# Patient Record
Sex: FEMALE | Race: WHITE | NOT HISPANIC OR LATINO | URBAN - METROPOLITAN AREA
[De-identification: names, ages, dates, MRNs, and addresses within clinical notes are randomized per-mention and may not be internally consistent; named-entity substitution may affect disease eponyms.]

---

## 2017-10-27 ENCOUNTER — INPATIENT (INPATIENT)
Facility: HOSPITAL | Age: 67
LOS: 9 days | Discharge: NURSING, REHAB OR LONG-TERM CARE | DRG: 482 | End: 2017-11-06
Attending: ORTHOPAEDIC SURGERY | Admitting: ORTHOPAEDIC SURGERY
Payer: MEDICARE

## 2017-10-27 VITALS
DIASTOLIC BLOOD PRESSURE: 85 MMHG | SYSTOLIC BLOOD PRESSURE: 148 MMHG | HEART RATE: 89 BPM | RESPIRATION RATE: 16 BRPM | OXYGEN SATURATION: 99 % | TEMPERATURE: 98 F

## 2017-10-27 DIAGNOSIS — Z85.831 PERSONAL HISTORY OF MALIGNANT NEOPLASM OF SOFT TISSUE: ICD-10-CM

## 2017-10-27 DIAGNOSIS — T50.8X5A ADVERSE EFFECT OF DIAGNOSTIC AGENTS, INITIAL ENCOUNTER: ICD-10-CM

## 2017-10-27 DIAGNOSIS — Z92.3 PERSONAL HISTORY OF IRRADIATION: ICD-10-CM

## 2017-10-27 LAB
ALBUMIN SERPL ELPH-MCNC: 4.5 G/DL — SIGNIFICANT CHANGE UP (ref 3.3–5)
ALP SERPL-CCNC: 82 U/L — SIGNIFICANT CHANGE UP (ref 40–120)
ALT FLD-CCNC: 17 U/L — SIGNIFICANT CHANGE UP (ref 10–45)
ANION GAP SERPL CALC-SCNC: 18 MMOL/L — HIGH (ref 5–17)
APTT BLD: 27.9 SEC — SIGNIFICANT CHANGE UP (ref 27.5–37.4)
AST SERPL-CCNC: 16 U/L — SIGNIFICANT CHANGE UP (ref 10–40)
BILIRUB SERPL-MCNC: 0.6 MG/DL — SIGNIFICANT CHANGE UP (ref 0.2–1.2)
BLD GP AB SCN SERPL QL: NEGATIVE — SIGNIFICANT CHANGE UP
BLD GP AB SCN SERPL QL: NEGATIVE — SIGNIFICANT CHANGE UP
BUN SERPL-MCNC: 11 MG/DL — SIGNIFICANT CHANGE UP (ref 7–23)
CALCIUM SERPL-MCNC: 10 MG/DL — SIGNIFICANT CHANGE UP (ref 8.4–10.5)
CHLORIDE SERPL-SCNC: 99 MMOL/L — SIGNIFICANT CHANGE UP (ref 96–108)
CO2 SERPL-SCNC: 21 MMOL/L — LOW (ref 22–31)
CREAT SERPL-MCNC: 0.52 MG/DL — SIGNIFICANT CHANGE UP (ref 0.5–1.3)
GLUCOSE SERPL-MCNC: 124 MG/DL — HIGH (ref 70–99)
HCT VFR BLD CALC: 42.8 % — SIGNIFICANT CHANGE UP (ref 34.5–45)
HGB BLD-MCNC: 14.2 G/DL — SIGNIFICANT CHANGE UP (ref 11.5–15.5)
INR BLD: 1.05 — SIGNIFICANT CHANGE UP (ref 0.88–1.16)
MAGNESIUM SERPL-MCNC: 2 MG/DL — SIGNIFICANT CHANGE UP (ref 1.6–2.6)
MCHC RBC-ENTMCNC: 30 PG — SIGNIFICANT CHANGE UP (ref 27–34)
MCHC RBC-ENTMCNC: 33.2 G/DL — SIGNIFICANT CHANGE UP (ref 32–36)
MCV RBC AUTO: 90.3 FL — SIGNIFICANT CHANGE UP (ref 80–100)
PLATELET # BLD AUTO: 366 K/UL — SIGNIFICANT CHANGE UP (ref 150–400)
POTASSIUM SERPL-MCNC: 3.5 MMOL/L — SIGNIFICANT CHANGE UP (ref 3.5–5.3)
POTASSIUM SERPL-SCNC: 3.5 MMOL/L — SIGNIFICANT CHANGE UP (ref 3.5–5.3)
PROT SERPL-MCNC: 7.9 G/DL — SIGNIFICANT CHANGE UP (ref 6–8.3)
PROTHROM AB SERPL-ACNC: 11.7 SEC — SIGNIFICANT CHANGE UP (ref 9.8–12.7)
RBC # BLD: 4.74 M/UL — SIGNIFICANT CHANGE UP (ref 3.8–5.2)
RBC # FLD: 13.3 % — SIGNIFICANT CHANGE UP (ref 10.3–16.9)
RH IG SCN BLD-IMP: POSITIVE — SIGNIFICANT CHANGE UP
RH IG SCN BLD-IMP: POSITIVE — SIGNIFICANT CHANGE UP
SODIUM SERPL-SCNC: 138 MMOL/L — SIGNIFICANT CHANGE UP (ref 135–145)
WBC # BLD: 12.8 K/UL — HIGH (ref 3.8–10.5)
WBC # FLD AUTO: 12.8 K/UL — HIGH (ref 3.8–10.5)

## 2017-10-27 PROCEDURE — 71010: CPT | Mod: 26

## 2017-10-27 PROCEDURE — 73552 X-RAY EXAM OF FEMUR 2/>: CPT | Mod: 26,RT

## 2017-10-27 PROCEDURE — 73723 MRI JOINT LWR EXTR W/O&W/DYE: CPT | Mod: 26,RT

## 2017-10-27 PROCEDURE — 73700 CT LOWER EXTREMITY W/O DYE: CPT | Mod: 26,RT

## 2017-10-27 PROCEDURE — 99285 EMERGENCY DEPT VISIT HI MDM: CPT

## 2017-10-27 PROCEDURE — 73560 X-RAY EXAM OF KNEE 1 OR 2: CPT | Mod: 26,RT

## 2017-10-27 RX ORDER — ACETAMINOPHEN 500 MG
650 TABLET ORAL EVERY 6 HOURS
Qty: 0 | Refills: 0 | Status: DISCONTINUED | OUTPATIENT
Start: 2017-10-27 | End: 2017-10-30

## 2017-10-27 RX ORDER — DOCUSATE SODIUM 100 MG
100 CAPSULE ORAL THREE TIMES A DAY
Qty: 0 | Refills: 0 | Status: DISCONTINUED | OUTPATIENT
Start: 2017-10-27 | End: 2017-10-30

## 2017-10-27 RX ORDER — METOCLOPRAMIDE HCL 10 MG
10 TABLET ORAL EVERY 6 HOURS
Qty: 0 | Refills: 0 | Status: DISCONTINUED | OUTPATIENT
Start: 2017-10-27 | End: 2017-11-02

## 2017-10-27 RX ORDER — SODIUM CHLORIDE 9 MG/ML
1000 INJECTION, SOLUTION INTRAVENOUS
Qty: 0 | Refills: 0 | Status: DISCONTINUED | OUTPATIENT
Start: 2017-10-27 | End: 2017-11-02

## 2017-10-27 RX ORDER — OXYCODONE HYDROCHLORIDE 5 MG/1
10 TABLET ORAL EVERY 4 HOURS
Qty: 0 | Refills: 0 | Status: DISCONTINUED | OUTPATIENT
Start: 2017-10-27 | End: 2017-10-30

## 2017-10-27 RX ORDER — ACETAMINOPHEN 500 MG
650 TABLET ORAL EVERY 6 HOURS
Qty: 0 | Refills: 0 | Status: DISCONTINUED | OUTPATIENT
Start: 2017-10-27 | End: 2017-11-02

## 2017-10-27 RX ORDER — MORPHINE SULFATE 50 MG/1
4 CAPSULE, EXTENDED RELEASE ORAL EVERY 4 HOURS
Qty: 0 | Refills: 0 | Status: DISCONTINUED | OUTPATIENT
Start: 2017-10-27 | End: 2017-10-28

## 2017-10-27 NOTE — ED PROVIDER NOTE - MUSCULOSKELETAL MINIMAL EXAM
RANGE OF MOTION LIMITED/Right knee and distal quadriceps swollen, tense, Deformed right distal femur, Ballotment patellae + on right, pulses intact, sensation intact, no erythema or warmth Right knee and distal quadriceps swollen, tense, Deformed right distal femur, Ballotment patellae + on right, pulses intact, sensation intact, no erythema or warmth, +anterior drawer test, valgus and varus laxity present on right. Left all intact/RANGE OF MOTION LIMITED

## 2017-10-27 NOTE — ED PROVIDER NOTE - NS ED ROS FT
REVIEW OF SYSTEMS:    CONSTITUTIONAL: No fever, weight loss, or fatigue  EYES: No eye pain, visual disturbances, or discharge  ENMT:  No difficulty hearing, tinnitus, vertigo; No sinus or throat pain  NECK: No pain or stiffness  BREASTS: No pain, masses, or nipple discharge  RESPIRATORY: No cough, wheezing, chills or hemoptysis; No shortness of breath  CARDIOVASCULAR: No chest pain, palpitations, dizziness, or leg swelling  GASTROINTESTINAL: No abdominal or epigastric pain. No nausea, vomiting, or hematemesis; No diarrhea or constipation. No melena or hematochezia.  GENITOURINARY: No dysuria, frequency, hematuria, or incontinence  NEUROLOGICAL: No headaches, memory loss, loss of strength, numbness, or tremors  SKIN: No itching, burning, rashes, or lesions   LYMPH NODES: No enlarged glands  ENDOCRINE: No heat or cold intolerance; No hair loss  MUSCULOSKELETAL: as above  PSYCHIATRIC: No depression, anxiety, mood swings, or difficulty sleeping  HEME/LYMPH: No easy bruising, or bleeding gums  ALLERY AND IMMUNOLOGIC: No hives or eczema

## 2017-10-27 NOTE — ED ADULT NURSE NOTE - CHPI ED SYMPTOMS NEG
no tingling/no chills/no decreased eating/drinking/no nausea/no numbness/no dizziness/no weakness/no vomiting/no fever

## 2017-10-27 NOTE — ED PROVIDER NOTE - OBJECTIVE STATEMENT
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 She reports she was standing in a dressing room and did a semi external rotation movement in her right knee when she heard a popping sound. She subsequently wasn't able to stand on her right leg anymore, felt like the knee was twisting below her. She never had injuries like this in the past.

## 2017-10-27 NOTE — ED PROVIDER NOTE - MEDICAL DECISION MAKING DETAILS
68 yo F with h/o leiomyosarcoma 13 yrs ago pw right knee pain and inability to ambulate, found to leave right femoral fracuture, likely pathologic. Preop labs, EKG and CXR done. Ortho consulted, admit to Ortho under  for surgery tomorrow. 68 yo F with h/o leiomyosarcoma 13 yrs ago pw right knee pain and inability to ambulate, found to leave right femoral fx, likely pathologic. Preop labs, EKG and CXR done. Ortho consulted, admit to Ortho under  for surgery tomorrow.

## 2017-10-27 NOTE — ED ADULT NURSE NOTE - OBJECTIVE STATEMENT
67 year female patient with c/o of R knee pain after twisting the wrong way.  No distress noted.  Breathing easily and unlabored.  States unable to bear weight.  Rknee appears swollen.

## 2017-10-27 NOTE — ED PROVIDER NOTE - ATTENDING CONTRIBUTION TO CARE
66 yo F with prior leiomysarcoma left quad 2000 s/p partial resection- had twisting injury to left leg 1 hr before arrival - now with distal femur fracture- likely pathologic- req admission for ORIF

## 2017-10-28 DIAGNOSIS — Z01.818 ENCOUNTER FOR OTHER PREPROCEDURAL EXAMINATION: ICD-10-CM

## 2017-10-28 DIAGNOSIS — C49.9 MALIGNANT NEOPLASM OF CONNECTIVE AND SOFT TISSUE, UNSPECIFIED: ICD-10-CM

## 2017-10-28 DIAGNOSIS — S72.91XA UNSPECIFIED FRACTURE OF RIGHT FEMUR, INITIAL ENCOUNTER FOR CLOSED FRACTURE: ICD-10-CM

## 2017-10-28 LAB
ANION GAP SERPL CALC-SCNC: 13 MMOL/L — SIGNIFICANT CHANGE UP (ref 5–17)
APPEARANCE UR: CLEAR — SIGNIFICANT CHANGE UP
BASOPHILS NFR BLD AUTO: 0.3 % — SIGNIFICANT CHANGE UP (ref 0–2)
BILIRUB UR-MCNC: NEGATIVE — SIGNIFICANT CHANGE UP
BUN SERPL-MCNC: 9 MG/DL — SIGNIFICANT CHANGE UP (ref 7–23)
CALCIUM SERPL-MCNC: 9.1 MG/DL — SIGNIFICANT CHANGE UP (ref 8.4–10.5)
CHLORIDE SERPL-SCNC: 102 MMOL/L — SIGNIFICANT CHANGE UP (ref 96–108)
CO2 SERPL-SCNC: 24 MMOL/L — SIGNIFICANT CHANGE UP (ref 22–31)
COLOR SPEC: YELLOW — SIGNIFICANT CHANGE UP
CREAT SERPL-MCNC: 0.55 MG/DL — SIGNIFICANT CHANGE UP (ref 0.5–1.3)
DIFF PNL FLD: NEGATIVE — SIGNIFICANT CHANGE UP
EOSINOPHIL NFR BLD AUTO: 0.4 % — SIGNIFICANT CHANGE UP (ref 0–6)
GLUCOSE SERPL-MCNC: 103 MG/DL — HIGH (ref 70–99)
GLUCOSE UR QL: NEGATIVE — SIGNIFICANT CHANGE UP
HCT VFR BLD CALC: 38.2 % — SIGNIFICANT CHANGE UP (ref 34.5–45)
HGB BLD-MCNC: 12.5 G/DL — SIGNIFICANT CHANGE UP (ref 11.5–15.5)
KETONES UR-MCNC: >=80 MG/DL
LEUKOCYTE ESTERASE UR-ACNC: (no result)
LYMPHOCYTES # BLD AUTO: 18.9 % — SIGNIFICANT CHANGE UP (ref 13–44)
MCHC RBC-ENTMCNC: 30 PG — SIGNIFICANT CHANGE UP (ref 27–34)
MCHC RBC-ENTMCNC: 32.7 G/DL — SIGNIFICANT CHANGE UP (ref 32–36)
MCV RBC AUTO: 91.8 FL — SIGNIFICANT CHANGE UP (ref 80–100)
MONOCYTES NFR BLD AUTO: 8.6 % — SIGNIFICANT CHANGE UP (ref 2–14)
NEUTROPHILS NFR BLD AUTO: 71.8 % — SIGNIFICANT CHANGE UP (ref 43–77)
NITRITE UR-MCNC: NEGATIVE — SIGNIFICANT CHANGE UP
PH UR: 6.5 — SIGNIFICANT CHANGE UP (ref 5–8)
PLATELET # BLD AUTO: 329 K/UL — SIGNIFICANT CHANGE UP (ref 150–400)
POTASSIUM SERPL-MCNC: 3.6 MMOL/L — SIGNIFICANT CHANGE UP (ref 3.5–5.3)
POTASSIUM SERPL-SCNC: 3.6 MMOL/L — SIGNIFICANT CHANGE UP (ref 3.5–5.3)
PROT UR-MCNC: NEGATIVE MG/DL — SIGNIFICANT CHANGE UP
RBC # BLD: 4.16 M/UL — SIGNIFICANT CHANGE UP (ref 3.8–5.2)
RBC # FLD: 13.2 % — SIGNIFICANT CHANGE UP (ref 10.3–16.9)
SODIUM SERPL-SCNC: 139 MMOL/L — SIGNIFICANT CHANGE UP (ref 135–145)
SP GR SPEC: <=1.005 — SIGNIFICANT CHANGE UP (ref 1–1.03)
UROBILINOGEN FLD QL: 0.2 E.U./DL — SIGNIFICANT CHANGE UP
WBC # BLD: 11.9 K/UL — HIGH (ref 3.8–10.5)
WBC # FLD AUTO: 11.9 K/UL — HIGH (ref 3.8–10.5)

## 2017-10-28 PROCEDURE — 99222 1ST HOSP IP/OBS MODERATE 55: CPT | Mod: GC

## 2017-10-28 RX ORDER — ZALEPLON 10 MG
5 CAPSULE ORAL AT BEDTIME
Qty: 0 | Refills: 0 | Status: DISCONTINUED | OUTPATIENT
Start: 2017-10-28 | End: 2017-11-02

## 2017-10-28 RX ORDER — MORPHINE SULFATE 50 MG/1
2 CAPSULE, EXTENDED RELEASE ORAL EVERY 4 HOURS
Qty: 0 | Refills: 0 | Status: DISCONTINUED | OUTPATIENT
Start: 2017-10-28 | End: 2017-11-02

## 2017-10-28 RX ADMIN — Medication 650 MILLIGRAM(S): at 01:16

## 2017-10-28 RX ADMIN — MORPHINE SULFATE 2 MILLIGRAM(S): 50 CAPSULE, EXTENDED RELEASE ORAL at 23:19

## 2017-10-28 RX ADMIN — SODIUM CHLORIDE 80 MILLILITER(S): 9 INJECTION, SOLUTION INTRAVENOUS at 01:10

## 2017-10-28 RX ADMIN — Medication 650 MILLIGRAM(S): at 17:26

## 2017-10-28 RX ADMIN — Medication 650 MILLIGRAM(S): at 12:18

## 2017-10-28 RX ADMIN — Medication 650 MILLIGRAM(S): at 11:19

## 2017-10-28 RX ADMIN — Medication 650 MILLIGRAM(S): at 18:00

## 2017-10-28 RX ADMIN — Medication 650 MILLIGRAM(S): at 02:25

## 2017-10-28 RX ADMIN — Medication 10 MILLIGRAM(S): at 22:59

## 2017-10-28 RX ADMIN — MORPHINE SULFATE 2 MILLIGRAM(S): 50 CAPSULE, EXTENDED RELEASE ORAL at 22:13

## 2017-10-28 RX ADMIN — Medication 100 MILLIGRAM(S): at 21:31

## 2017-10-28 RX ADMIN — Medication 5 MILLIGRAM(S): at 01:53

## 2017-10-28 RX ADMIN — Medication 100 MILLIGRAM(S): at 13:05

## 2017-10-28 RX ADMIN — SODIUM CHLORIDE 80 MILLILITER(S): 9 INJECTION, SOLUTION INTRAVENOUS at 13:05

## 2017-10-28 NOTE — H&P ADULT - ASSESSMENT
66F w/ PMH leiomyosarcoma p/w R distal femur fx s/p twisting injury, concern for pathologic fx  -Admit to ortho- Dr. Mccurdy  -Preopped for possible R CMN, however pending imaging and plan from attending  -f/u CT/MRI reads for r/o malignancy  -NWB in  RLE  -pain control  -plan pending imaging and further discussion w/ patient  -case d/w chief resident and Dr. Mccurdy

## 2017-10-28 NOTE — H&P ADULT - HISTORY OF PRESENT ILLNESS
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  Pt reports she was in a changing room at a store when she twisted around her R leg the wrong way, felt a pop, and then noticed immediate swelling/deformity/pain. Denies any other injury/trauma, numbness/weakness, any other CA hx.

## 2017-10-28 NOTE — H&P ADULT - NSHPPHYSICALEXAM_GEN_ALL_CORE
NAD, AOx3, comfortable  R thigh: large healed scar on medial thigh, +swelling/deformity at distal femur  5/5 EHL/FHL/GS/TA  Sensory: SILT  Pulses: wwp, DP/PT 2+

## 2017-10-28 NOTE — CONSULT NOTE ADULT - PROBLEM SELECTOR RECOMMENDATION 3
The patient's medical condition is optimized for surgery.  There is no contraindication for surgery.  There is no clinical evidence neither of angina, decompensated CHF, arrhthymias, nor valvular disease.   There is no limitation of exercise capacity.  MET is7  .  ASA class is 2.  Conde cardiac risk factor is low  .  DVT prophylaxis is indicated.  Pain control.  Early mobilization.  Avoid fluid overload.

## 2017-10-29 PROCEDURE — 71260 CT THORAX DX C+: CPT | Mod: 26

## 2017-10-29 PROCEDURE — 99232 SBSQ HOSP IP/OBS MODERATE 35: CPT

## 2017-10-29 RX ORDER — HEPARIN SODIUM 5000 [USP'U]/ML
5000 INJECTION INTRAVENOUS; SUBCUTANEOUS EVERY 8 HOURS
Qty: 0 | Refills: 0 | Status: DISCONTINUED | OUTPATIENT
Start: 2017-10-29 | End: 2017-10-29

## 2017-10-29 RX ADMIN — Medication 100 MILLIGRAM(S): at 22:19

## 2017-10-29 RX ADMIN — Medication 650 MILLIGRAM(S): at 09:58

## 2017-10-29 RX ADMIN — MORPHINE SULFATE 2 MILLIGRAM(S): 50 CAPSULE, EXTENDED RELEASE ORAL at 22:19

## 2017-10-29 RX ADMIN — Medication 10 MILLIGRAM(S): at 22:18

## 2017-10-29 RX ADMIN — Medication 100 MILLIGRAM(S): at 13:48

## 2017-10-29 RX ADMIN — Medication 650 MILLIGRAM(S): at 10:58

## 2017-10-29 RX ADMIN — MORPHINE SULFATE 2 MILLIGRAM(S): 50 CAPSULE, EXTENDED RELEASE ORAL at 22:35

## 2017-10-29 NOTE — PROGRESS NOTE ADULT - SUBJECTIVE AND OBJECTIVE BOX
S: Patient seen and examined. Pt. doing well, Pain endorsed but controlled. No acute events overnight.    Vital Signs Last 24 Hrs  T(C): 37.6 (29 Oct 2017 08:29), Max: 37.6 (28 Oct 2017 20:19)  T(F): 99.6 (29 Oct 2017 08:29), Max: 99.7 (28 Oct 2017 20:19)  HR: 73 (29 Oct 2017 08:29) (73 - 92)  BP: 124/83 (29 Oct 2017 08:29) (124/83 - 135/64)  BP(mean): --  RR: 16 (29 Oct 2017 08:29) (15 - 17)  SpO2: 98% (29 Oct 2017 08:29) (95% - 98%)    NAD, AOx3, comfortable  Motor: 5/5 GS/TA/EHL/FHL    Sensation: SILT   Pulses: WWP, DP/PT 2+                              12.5   11.9  )-----------( 329      ( 28 Oct 2017 06:16 )             38.2         A/P:  67y Female w/ R femur pathologic fx    -Stable  -plan for IR guided biopsy Monday  -Pain Control  -PT/NWB in KI  -DVT ppx: HSQ  -Advance diet as tolerated  -f/u AM labs  -f/u CT chest  -Dispo: pending      Armando Gill MD, PGY-2  [phone]

## 2017-10-30 DIAGNOSIS — R91.1 SOLITARY PULMONARY NODULE: ICD-10-CM

## 2017-10-30 LAB
ANION GAP SERPL CALC-SCNC: 13 MMOL/L — SIGNIFICANT CHANGE UP (ref 5–17)
BUN SERPL-MCNC: 11 MG/DL — SIGNIFICANT CHANGE UP (ref 7–23)
CALCIUM SERPL-MCNC: 9.1 MG/DL — SIGNIFICANT CHANGE UP (ref 8.4–10.5)
CHLORIDE SERPL-SCNC: 103 MMOL/L — SIGNIFICANT CHANGE UP (ref 96–108)
CO2 SERPL-SCNC: 26 MMOL/L — SIGNIFICANT CHANGE UP (ref 22–31)
CREAT SERPL-MCNC: 0.58 MG/DL — SIGNIFICANT CHANGE UP (ref 0.5–1.3)
GLUCOSE SERPL-MCNC: 107 MG/DL — HIGH (ref 70–99)
HCT VFR BLD CALC: 36.8 % — SIGNIFICANT CHANGE UP (ref 34.5–45)
HGB BLD-MCNC: 12.6 G/DL — SIGNIFICANT CHANGE UP (ref 11.5–15.5)
MCHC RBC-ENTMCNC: 31.3 PG — SIGNIFICANT CHANGE UP (ref 27–34)
MCHC RBC-ENTMCNC: 34.2 G/DL — SIGNIFICANT CHANGE UP (ref 32–36)
MCV RBC AUTO: 91.3 FL — SIGNIFICANT CHANGE UP (ref 80–100)
PLATELET # BLD AUTO: 309 K/UL — SIGNIFICANT CHANGE UP (ref 150–400)
POTASSIUM SERPL-MCNC: 3.6 MMOL/L — SIGNIFICANT CHANGE UP (ref 3.5–5.3)
POTASSIUM SERPL-SCNC: 3.6 MMOL/L — SIGNIFICANT CHANGE UP (ref 3.5–5.3)
RBC # BLD: 4.03 M/UL — SIGNIFICANT CHANGE UP (ref 3.8–5.2)
RBC # FLD: 13.3 % — SIGNIFICANT CHANGE UP (ref 10.3–16.9)
SODIUM SERPL-SCNC: 142 MMOL/L — SIGNIFICANT CHANGE UP (ref 135–145)
WBC # BLD: 9.2 K/UL — SIGNIFICANT CHANGE UP (ref 3.8–10.5)
WBC # FLD AUTO: 9.2 K/UL — SIGNIFICANT CHANGE UP (ref 3.8–10.5)

## 2017-10-30 PROCEDURE — 20225 BONE BIOPSY TROCAR/NDL DEEP: CPT

## 2017-10-30 PROCEDURE — 77012 CT SCAN FOR NEEDLE BIOPSY: CPT | Mod: 26

## 2017-10-30 PROCEDURE — 99232 SBSQ HOSP IP/OBS MODERATE 35: CPT | Mod: GC

## 2017-10-30 RX ORDER — POLYETHYLENE GLYCOL 3350 17 G/17G
17 POWDER, FOR SOLUTION ORAL DAILY
Qty: 0 | Refills: 0 | Status: DISCONTINUED | OUTPATIENT
Start: 2017-10-30 | End: 2017-11-02

## 2017-10-30 RX ORDER — SENNA PLUS 8.6 MG/1
2 TABLET ORAL AT BEDTIME
Qty: 0 | Refills: 0 | Status: DISCONTINUED | OUTPATIENT
Start: 2017-10-30 | End: 2017-11-02

## 2017-10-30 RX ORDER — OXYCODONE HYDROCHLORIDE 5 MG/1
5 TABLET ORAL EVERY 4 HOURS
Qty: 0 | Refills: 0 | Status: DISCONTINUED | OUTPATIENT
Start: 2017-10-30 | End: 2017-11-02

## 2017-10-30 RX ORDER — ACETAMINOPHEN 500 MG
975 TABLET ORAL EVERY 8 HOURS
Qty: 0 | Refills: 0 | Status: DISCONTINUED | OUTPATIENT
Start: 2017-10-30 | End: 2017-11-02

## 2017-10-30 RX ORDER — OXYCODONE HYDROCHLORIDE 5 MG/1
10 TABLET ORAL EVERY 4 HOURS
Qty: 0 | Refills: 0 | Status: DISCONTINUED | OUTPATIENT
Start: 2017-10-30 | End: 2017-11-02

## 2017-10-30 RX ORDER — DOCUSATE SODIUM 100 MG
100 CAPSULE ORAL THREE TIMES A DAY
Qty: 0 | Refills: 0 | Status: DISCONTINUED | OUTPATIENT
Start: 2017-10-30 | End: 2017-11-02

## 2017-10-30 RX ADMIN — SODIUM CHLORIDE 80 MILLILITER(S): 9 INJECTION, SOLUTION INTRAVENOUS at 03:26

## 2017-10-30 RX ADMIN — POLYETHYLENE GLYCOL 3350 17 GRAM(S): 17 POWDER, FOR SOLUTION ORAL at 17:31

## 2017-10-30 RX ADMIN — Medication 975 MILLIGRAM(S): at 17:29

## 2017-10-30 RX ADMIN — Medication 5 MILLIGRAM(S): at 22:19

## 2017-10-30 RX ADMIN — Medication 650 MILLIGRAM(S): at 11:30

## 2017-10-30 RX ADMIN — Medication 100 MILLIGRAM(S): at 05:25

## 2017-10-30 RX ADMIN — Medication 975 MILLIGRAM(S): at 22:00

## 2017-10-30 RX ADMIN — SENNA PLUS 2 TABLET(S): 8.6 TABLET ORAL at 21:25

## 2017-10-30 RX ADMIN — Medication 100 MILLIGRAM(S): at 21:25

## 2017-10-30 RX ADMIN — Medication 650 MILLIGRAM(S): at 10:03

## 2017-10-30 RX ADMIN — Medication 975 MILLIGRAM(S): at 21:25

## 2017-10-30 NOTE — PROGRESS NOTE ADULT - SUBJECTIVE AND OBJECTIVE BOX
S: Patient seen and examined. Pt. doing well, Pain endorsed but controlled. No acute events overnight.    Vital Signs Last 24 Hrs  T(C): 37.6 (29 Oct 2017 08:29), Max: 37.6 (28 Oct 2017 20:19)  T(F): 99.6 (29 Oct 2017 08:29), Max: 99.7 (28 Oct 2017 20:19)  HR: 73 (29 Oct 2017 08:29) (73 - 92)  BP: 124/83 (29 Oct 2017 08:29) (124/83 - 135/64)  BP(mean): --  RR: 16 (29 Oct 2017 08:29) (15 - 17)  SpO2: 98% (29 Oct 2017 08:29) (95% - 98%)    NAD, AOx3, comfortable  Motor: 5/5 GS/TA/EHL/FHL    Sensation: SILT   Pulses: WWP, DP/PT 2+                              12.5   11.9  )-----------( 329      ( 28 Oct 2017 06:16 )             38.2         A/P:  67y Female w/ R femur pathologic fx    -Stable  -plan for CT guided biopsy today  -Pain Control  -PT/NWB in KI  -DVT ppx: HSQ  -NPO for CT guided bx  -f/u AM labs  -f/u CT chest read  -Dispo: pending      Armando Gill MD, PGY-2  [phone] S: Patient seen and examined. Pt. doing well, Pain endorsed but controlled. No acute events overnight.    Vital Signs Last 24 Hrs  T(C): 36.8 (30 Oct 2017 08:49), Max: 37.1 (30 Oct 2017 05:00)  T(F): 98.3 (30 Oct 2017 08:49), Max: 98.7 (30 Oct 2017 05:00)  HR: 73 (30 Oct 2017 08:49) (73 - 83)  BP: 125/78 (30 Oct 2017 08:49) (117/72 - 125/78)  BP(mean): --  RR: 16 (30 Oct 2017 08:49) (16 - 17)  SpO2: 98% (30 Oct 2017 08:49) (96% - 98%)    NAD, AOx3, comfortable  Motor: 5/5 GS/TA/EHL/FHL    Sensation: SILT   Pulses: WWP, DP/PT 2+                                 12.6   9.2   )-----------( 309      ( 30 Oct 2017 06:08 )             36.8       A/P:  67y Female w/ R femur pathologic fx    -Stable  -plan for CT guided biopsy today  -Pain Control  -PT/NWB in KI  -DVT ppx: HSQ  -NPO for CT guided bx  -f/u AM labs  -f/u CT chest read  -Dispo: pending      Armando Gill MD, PGY-2  [phone]

## 2017-10-30 NOTE — PROGRESS NOTE ADULT - SUBJECTIVE AND OBJECTIVE BOX
Interval Events: reviewed  Patient seen and examined at bedside.    Patient is a 67y old  Female who presents with a chief complaint of R thigh pain (28 Oct 2017 00:13)  pain in the right leg.  She is for Bx today and had the CT scan of chest    PAST MEDICAL & SURGICAL HISTORY:  Leiomyosarcoma      MEDICATIONS:  Pulmonary:    Antimicrobials:    Anticoagulants:    Cardiac:      Allergies    No Known Allergies    Intolerances        Vital Signs Last 24 Hrs  T(C): 36.8 (30 Oct 2017 08:49), Max: 37.1 (30 Oct 2017 05:00)  T(F): 98.3 (30 Oct 2017 08:49), Max: 98.7 (30 Oct 2017 05:00)  HR: 73 (30 Oct 2017 08:49) (73 - 83)  BP: 125/78 (30 Oct 2017 08:49) (117/72 - 125/78)  BP(mean): --  RR: 16 (30 Oct 2017 08:49) (16 - 17)  SpO2: 98% (30 Oct 2017 08:49) (96% - 98%)        LABS:      CBC Full  -  ( 30 Oct 2017 06:08 )  WBC Count : 9.2 K/uL  Hemoglobin : 12.6 g/dL  Hematocrit : 36.8 %  Platelet Count - Automated : 309 K/uL  Mean Cell Volume : 91.3 fL  Mean Cell Hemoglobin : 31.3 pg  Mean Cell Hemoglobin Concentration : 34.2 g/dL  Auto Neutrophil # : x  Auto Lymphocyte # : x  Auto Monocyte # : x  Auto Eosinophil # : x  Auto Basophil # : x  Auto Neutrophil % : x  Auto Lymphocyte % : x  Auto Monocyte % : x  Auto Eosinophil % : x  Auto Basophil % : x    10-30    142  |  103  |  11  ----------------------------<  107<H>  3.6   |  26  |  0.58    Ca    9.1      30 Oct 2017 06:10      < from: CT Chest w/ IV Cont (10.29.17 @ 19:28) >  EXAM:  CT CHEST IC                          PROCEDURE DATE:  10/29/2017    Quantity of Contrast in Vial in ml: 100 Contrast Used: Optiray 350  Quantity of Contrast Wasted in ml: 7           INTERPRETATION:  CT of chest with contrast    Indication: Right thigh leiomyosarcoma. Malignancy workup.    Technique: CT of chest is performed following the administration of   intravenous contrast. Multiplanar images were reviewed.    Prior studies: None    Findings: Heart size is within normal limits. No pericardial effusion is   seen. There is no thoracic aortic aneurysm. There is a small calcified   right hilar lymph node likely a sequela of prior granulomatous disease.   No mediastinal or axillary lymphadenopathy is seen.    No pleural effusion is seen. There is a 6 mm part solid and part   groundglass nodule in the right lower lobe image #190 series 5. A few   micronodules are seen in the right lung apex measuring up to 4 mm. There   is a 2 mm nodule left upper lobe image 126 is 5.    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    Minimal degenerative changes of the spine are seen.    IMPRESSION:    6 mm nodule right lower lobe and a few other micronodules as above.   Findings are not typical for metastasis but recommend short-term   follow-up in 3 months.    < end of copied text >                      RADIOLOGY & ADDITIONAL STUDIES (The following images were personally reviewed):  Mota:                                  No  Urine output:               Yes         DVT prophylaxis:         Yes          Flattus:                          Yes         Bowel movement:              No

## 2017-10-31 LAB
NON-GYNECOLOGICAL CYTOLOGY STUDY: SIGNIFICANT CHANGE UP
SURGICAL PATHOLOGY STUDY: SIGNIFICANT CHANGE UP

## 2017-10-31 RX ORDER — HEPARIN SODIUM 5000 [USP'U]/ML
5000 INJECTION INTRAVENOUS; SUBCUTANEOUS EVERY 12 HOURS
Qty: 0 | Refills: 0 | Status: DISCONTINUED | OUTPATIENT
Start: 2017-10-31 | End: 2017-11-02

## 2017-10-31 RX ADMIN — Medication 100 MILLIGRAM(S): at 15:41

## 2017-10-31 RX ADMIN — Medication 975 MILLIGRAM(S): at 07:22

## 2017-10-31 RX ADMIN — Medication 975 MILLIGRAM(S): at 06:42

## 2017-10-31 RX ADMIN — Medication 975 MILLIGRAM(S): at 15:38

## 2017-10-31 RX ADMIN — Medication 975 MILLIGRAM(S): at 21:45

## 2017-10-31 RX ADMIN — HEPARIN SODIUM 5000 UNIT(S): 5000 INJECTION INTRAVENOUS; SUBCUTANEOUS at 21:45

## 2017-10-31 RX ADMIN — Medication 100 MILLIGRAM(S): at 06:42

## 2017-10-31 RX ADMIN — OXYCODONE HYDROCHLORIDE 5 MILLIGRAM(S): 5 TABLET ORAL at 22:45

## 2017-10-31 RX ADMIN — OXYCODONE HYDROCHLORIDE 5 MILLIGRAM(S): 5 TABLET ORAL at 22:02

## 2017-10-31 RX ADMIN — Medication 975 MILLIGRAM(S): at 22:30

## 2017-10-31 NOTE — PROGRESS NOTE ADULT - SUBJECTIVE AND OBJECTIVE BOX
S: Patient seen and examined. Pt. doing well, Pain endorsed but controlled. No acute events overnight.    Vital Signs Last 24 Hrs  T(C): 36.6 (31 Oct 2017 09:30), Max: 36.8 (31 Oct 2017 05:50)  T(F): 97.8 (31 Oct 2017 09:30), Max: 98.2 (31 Oct 2017 05:50)  HR: 73 (31 Oct 2017 09:30) (73 - 93)  BP: 116/66 (31 Oct 2017 09:30) (110/68 - 131/79)  BP(mean): --  RR: 16 (31 Oct 2017 09:30) (16 - 18)  SpO2: 97% (31 Oct 2017 09:30) (97% - 99%)    NAD, AOx3, comfortable  Motor: 5/5 GS/TA/EHL/FHL    Sensation: SILT   Pulses: WWP, DP/PT 2+                                 12.6   9.2   )-----------( 309      ( 30 Oct 2017 06:08 )             36.8       A/P:  67y Female w/ R femur pathologic fx    -Stable  -f/u CT bx results  -Pain Control  -PT/NWB in KI  -DVT ppx: HSQ  -f/u AM labs  -Dispo: pending      Armando Gill MD, PGY-2  [phone]

## 2017-10-31 NOTE — PROGRESS NOTE ADULT - SUBJECTIVE AND OBJECTIVE BOX
Ortho Preop Note    Patient is a 67y old  Female who presents with a chief complaint of R thigh pain (28 Oct 2017 00:13)    Diagnosis: R femur pathologic fx  Procedure: R femur retro CMN, ORIF  Surgeon: Kaz                          12.6   9.2   )-----------( 309      ( 30 Oct 2017 06:08 )             36.8     10-30    142  |  103  |  11  ----------------------------<  107<H>  3.6   |  26  |  0.58    Ca    9.1      30 Oct 2017 06:10            [x ] Type & Screen  [x ] CBC  [x ] BMP  [x ] PT/PTT/INR  [ x] Urinalysis  [x ] Chest X-ray  [x ] EKG  [ ] NPO/IVF Wed night  [x ] Consent  [x ] Clearance  [x ]  on OR Schedule  [x ] Anti-coagulation held       Assessment & Plan:  67yFemale with R pathologic femur fx  -For OR Thursday 11/2

## 2017-11-01 LAB
ANION GAP SERPL CALC-SCNC: 13 MMOL/L — SIGNIFICANT CHANGE UP (ref 5–17)
BUN SERPL-MCNC: 11 MG/DL — SIGNIFICANT CHANGE UP (ref 7–23)
CALCIUM SERPL-MCNC: 9.8 MG/DL — SIGNIFICANT CHANGE UP (ref 8.4–10.5)
CHLORIDE SERPL-SCNC: 101 MMOL/L — SIGNIFICANT CHANGE UP (ref 96–108)
CO2 SERPL-SCNC: 25 MMOL/L — SIGNIFICANT CHANGE UP (ref 22–31)
CREAT SERPL-MCNC: 0.54 MG/DL — SIGNIFICANT CHANGE UP (ref 0.5–1.3)
GLUCOSE SERPL-MCNC: 118 MG/DL — HIGH (ref 70–99)
HCT VFR BLD CALC: 40.8 % — SIGNIFICANT CHANGE UP (ref 34.5–45)
HGB BLD-MCNC: 13.8 G/DL — SIGNIFICANT CHANGE UP (ref 11.5–15.5)
MCHC RBC-ENTMCNC: 31.1 PG — SIGNIFICANT CHANGE UP (ref 27–34)
MCHC RBC-ENTMCNC: 33.8 G/DL — SIGNIFICANT CHANGE UP (ref 32–36)
MCV RBC AUTO: 91.9 FL — SIGNIFICANT CHANGE UP (ref 80–100)
PLATELET # BLD AUTO: 389 K/UL — SIGNIFICANT CHANGE UP (ref 150–400)
POTASSIUM SERPL-MCNC: 3.8 MMOL/L — SIGNIFICANT CHANGE UP (ref 3.5–5.3)
POTASSIUM SERPL-SCNC: 3.8 MMOL/L — SIGNIFICANT CHANGE UP (ref 3.5–5.3)
RBC # BLD: 4.44 M/UL — SIGNIFICANT CHANGE UP (ref 3.8–5.2)
RBC # FLD: 13.1 % — SIGNIFICANT CHANGE UP (ref 10.3–16.9)
SODIUM SERPL-SCNC: 139 MMOL/L — SIGNIFICANT CHANGE UP (ref 135–145)
WBC # BLD: 8 K/UL — SIGNIFICANT CHANGE UP (ref 3.8–10.5)
WBC # FLD AUTO: 8 K/UL — SIGNIFICANT CHANGE UP (ref 3.8–10.5)

## 2017-11-01 PROCEDURE — 99232 SBSQ HOSP IP/OBS MODERATE 35: CPT | Mod: GC

## 2017-11-01 RX ADMIN — HEPARIN SODIUM 5000 UNIT(S): 5000 INJECTION INTRAVENOUS; SUBCUTANEOUS at 10:25

## 2017-11-01 RX ADMIN — Medication 975 MILLIGRAM(S): at 06:00

## 2017-11-01 RX ADMIN — HEPARIN SODIUM 5000 UNIT(S): 5000 INJECTION INTRAVENOUS; SUBCUTANEOUS at 21:07

## 2017-11-01 RX ADMIN — OXYCODONE HYDROCHLORIDE 5 MILLIGRAM(S): 5 TABLET ORAL at 23:00

## 2017-11-01 RX ADMIN — Medication 650 MILLIGRAM(S): at 21:57

## 2017-11-01 RX ADMIN — Medication 975 MILLIGRAM(S): at 21:07

## 2017-11-01 RX ADMIN — Medication 975 MILLIGRAM(S): at 22:00

## 2017-11-01 RX ADMIN — Medication 975 MILLIGRAM(S): at 15:06

## 2017-11-01 RX ADMIN — OXYCODONE HYDROCHLORIDE 5 MILLIGRAM(S): 5 TABLET ORAL at 22:12

## 2017-11-01 RX ADMIN — Medication 975 MILLIGRAM(S): at 05:21

## 2017-11-01 RX ADMIN — Medication 975 MILLIGRAM(S): at 16:06

## 2017-11-01 NOTE — DISCHARGE NOTE ADULT - CARE PROVIDER_API CALL
Dutch Armenta (MD), Orthopaedic Surgery  [address]  [address] [place] [address]  Phone: [phone]  Fax: [phone] Adam Mccurdy), Orthopaedic Surgery  [address]  [address] [place] [address]  Phone: [phone]  Fax: [phone]

## 2017-11-01 NOTE — PROGRESS NOTE ADULT - SUBJECTIVE AND OBJECTIVE BOX
S: Patient seen and examined. Pt. doing well, Pain endorsed but controlled. No acute events overnight.    Vital Signs Last 24 Hrs  T(C): 36.3 (01 Nov 2017 05:20), Max: 36.6 (31 Oct 2017 09:30)  T(F): 97.3 (01 Nov 2017 05:20), Max: 97.8 (31 Oct 2017 09:30)  HR: 88 (01 Nov 2017 05:20) (73 - 105)  BP: 143/82 (01 Nov 2017 05:20) (109/65 - 143/82)  BP(mean): --  RR: 16 (01 Nov 2017 05:20) (16 - 17)  SpO2: 100% (01 Nov 2017 05:20) (94% - 100%)    NAD, AOx3, comfortable  Motor: 5/5 GS/TA/EHL/FHL    Sensation: SILT   Pulses: WWP, DP/PT 2+                                 12.6   9.2   )-----------( 309      ( 30 Oct 2017 06:08 )             36.8       A/P:  67y Female w/ R femur pathologic fx    -Stable  -For OR Thurs 11/1  -Pain Control  -PT/NWB in KI  -DVT ppx: hold  -NPO@MN  -f/u AM labs  -Dispo: pending      Armando Gill MD, PGY-2  [phone]

## 2017-11-01 NOTE — PROGRESS NOTE ADULT - SUBJECTIVE AND OBJECTIVE BOX
Interval Events: reviewed  Patient seen and examined at bedside.    Patient is a 67y old  Female who presents with a chief complaint of R thigh pain (28 Oct 2017 00:13)  she is thinking about the kind of surgery she is having    PAST MEDICAL & SURGICAL HISTORY:  Leiomyosarcoma      MEDICATIONS:  Pulmonary:    Antimicrobials:    Anticoagulants:    Cardiac:      Allergies    No Known Allergies    Intolerances        Vital Signs Last 24 Hrs  T(C): 36.8 (30 Oct 2017 08:49), Max: 37.1 (30 Oct 2017 05:00)  T(F): 98.3 (30 Oct 2017 08:49), Max: 98.7 (30 Oct 2017 05:00)  HR: 73 (30 Oct 2017 08:49) (73 - 83)  BP: 125/78 (30 Oct 2017 08:49) (117/72 - 125/78)  BP(mean): --  RR: 16 (30 Oct 2017 08:49) (16 - 17)  SpO2: 98% (30 Oct 2017 08:49) (96% - 98%)        LABS:      CBC Full  -  ( 30 Oct 2017 06:08 )  WBC Count : 9.2 K/uL  Hemoglobin : 12.6 g/dL  Hematocrit : 36.8 %  Platelet Count - Automated : 309 K/uL  Mean Cell Volume : 91.3 fL  Mean Cell Hemoglobin : 31.3 pg  Mean Cell Hemoglobin Concentration : 34.2 g/dL  Auto Neutrophil # : x  Auto Lymphocyte # : x  Auto Monocyte # : x  Auto Eosinophil # : x  Auto Basophil # : x  Auto Neutrophil % : x  Auto Lymphocyte % : x  Auto Monocyte % : x  Auto Eosinophil % : x  Auto Basophil % : x    10-30    142  |  103  |  11  ----------------------------<  107<H>  3.6   |  26  |  0.58    Ca    9.1      30 Oct 2017 06:10      < from: CT Chest w/ IV Cont (10.29.17 @ 19:28) >  EXAM:  CT CHEST IC                          PROCEDURE DATE:  10/29/2017    Quantity of Contrast in Vial in ml: 100 Contrast Used: Optiray 350  Quantity of Contrast Wasted in ml: 7           INTERPRETATION:  CT of chest with contrast    Indication: Right thigh leiomyosarcoma. Malignancy workup.    Technique: CT of chest is performed following the administration of   intravenous contrast. Multiplanar images were reviewed.    Prior studies: None    Findings: Heart size is within normal limits. No pericardial effusion is   seen. There is no thoracic aortic aneurysm. There is a small calcified   right hilar lymph node likely a sequela of prior granulomatous disease.   No mediastinal or axillary lymphadenopathy is seen.    No pleural effusion is seen. There is a 6 mm part solid and part   groundglass nodule in the right lower lobe image #190 series 5. A few   micronodules are seen in the right lung apex measuring up to 4 mm. There   is a 2 mm nodule left upper lobe image 126 is 5.    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    Minimal degenerative changes of the spine are seen.    IMPRESSION:    6 mm nodule right lower lobe and a few other micronodules as above.   Findings are not typical for metastasis but recommend short-term   follow-up in 3 months.    < end of copied text >    Surgical Pathology Report (10.30.17 @ 13:09)    Surgical Pathology Report:   ACCESSION No:  [id number]    MARYANN HOANG                         1        Surgical Final Report          Final Diagnosis    Bone, right femur biopsy:  - Fragments of viable and nonviable sclerotic bone  - Fibrous tissue and atrophic skeletal muscle    Note: This case was reviewed at the consensus conference in  accordance with departmental policy.    Ankush Lester M.D.  (Electronic Signature)  Reported on: 10/31/17    Clinical History  Pathologic fracture right femur  History of leiomyosarcoma status post radiation therapy    Specimen(s) Submitted  1     Right femur    Gross Description  The specimen is received in formalin, labeled with the patient's  identification and "right femur."  It consists of three cores of  white-tan bone ranging from 0.3-0.7 cm in length with an average  diameter of 0.2 cm.  The specimen is entirely submitted in one  cassette, 1A, following decalcification in Immunocal.  PH 10/30/17 13:24                      RADIOLOGY & ADDITIONAL STUDIES (The following images were personally reviewed):  Mota:                                  No  Urine output:               Yes         DVT prophylaxis:         Yes          Flattus:                          Yes         Bowel movement:              No

## 2017-11-01 NOTE — DISCHARGE NOTE ADULT - HOSPITAL COURSE
Admit  OR  Periop Antibx  DVT ppx  PT   Pain mgt Admit 10/27/17  OR 11/2/17- R hip nailing, right femur ORIF  Periop Antibx  DVT ppx  PT   Pain mgt   med c/s  onc c/s

## 2017-11-01 NOTE — DISCHARGE NOTE ADULT - MEDICATION SUMMARY - MEDICATIONS TO TAKE
I will START or STAY ON the medications listed below when I get home from the hospital:    acetaminophen 325 mg oral tablet  -- 2 tab(s) by mouth every 6 hours, As needed, For Temp over 38.3 C (100.94 F) or mild pain (1-3)  -- Indication: For mild pain/ fever    oxyCODONE 10 mg oral tablet  -- 1 tab(s) by mouth every 4 hours, As needed, Moderate Pain  -- Indication: For moderate pain    enoxaparin  -- 40mg, subcutaneous, every 24 hours until follow up with orthopedist in [place]  -- Indication: For dvt ppx    famotidine 20 mg oral tablet  -- 1 tab(s) by mouth every 12 hours  -- Indication: For gastrointestinal agent    bisacodyl 10 mg rectal suppository  -- 1 suppository(ies) rectally once a day, As needed, If no bowel movement by postoperative day #2  -- Indication: For constipation    docusate sodium 100 mg oral capsule  -- 1 cap(s) by mouth 3 times a day  -- Indication: For constipation    polyethylene glycol 3350 oral powder for reconstitution  -- 17 gram(s) by mouth once a day  -- Indication: For constipation    senna oral tablet  -- 2 tab(s) by mouth once a day (at bedtime), As needed, Constipation  -- Indication: For constipation    Multiple Vitamins oral tablet  -- 1 tab(s) by mouth once a day  -- Indication: For supplement

## 2017-11-01 NOTE — PROGRESS NOTE ADULT - SUBJECTIVE AND OBJECTIVE BOX
Ortho Note    Pt comfortable without complaints, pain controlled  Denies CP, SOB, N/V, numbness/tingling    Vital Signs Last 24 Hrs  T(C): 36.7 (11-01-17 @ 16:09), Max: 36.7 (11-01-17 @ 16:09)  T(F): 98 (11-01-17 @ 16:09), Max: 98 (11-01-17 @ 16:09)  HR: 97 (11-01-17 @ 16:09) (97 - 97)  BP: 139/67 (11-01-17 @ 16:09) (139/67 - 139/67)  BP(mean): --  RR: 16 (11-01-17 @ 16:09) (16 - 16)  SpO2: 97% (11-01-17 @ 16:09) (97% - 97%)    General: Pt Alert and oriented, NAD  Right leg w/ brace  Pulses intact RLE  Sensation intact RLE  Motor: EHL/FHL/TA/GS 5/5 RLE                          13.8   8.0   )-----------( 389      ( 01 Nov 2017 11:11 )             40.8   01 Nov 2017 11:11    139    |  101    |  11     ----------------------------<  118    3.8     |  25     |  0.54     Ca    9.8        01 Nov 2017 11:11        A/P: 67yFemale with Right distal femur fx  - Stable  - Pain Control  - DVT ppx heparin  - Bedrest/NWB/Brace  - Heme/Onc consult Dr. Golden  - OR Tomorrow    Ortho Pager [phone]

## 2017-11-01 NOTE — DIETITIAN INITIAL EVALUATION ADULT. - ENERGY NEEDS
IBW 53.4Kg  %%  BMI 25.3    Utilized IBW to calculate needs, pt >120% of IBW. Adjusted for pre-op/age.

## 2017-11-01 NOTE — DIETITIAN INITIAL EVALUATION ADULT. - OTHER INFO
68y/o F with femur pathologic fx; for the OR tomorrow. Pt reports a good appetite and intake; consuming ~75% of meals. Denies GI distress, pain, and mechanical issues. PTA pt reports eating well and denies wt changes. Will follow.

## 2017-11-01 NOTE — DISCHARGE NOTE ADULT - NS AS ACTIVITY OBS
No Heavy lifting/straining Showering allowed/Do not make important decisions/No Heavy lifting/straining/Do not drive or operate machinery

## 2017-11-01 NOTE — DISCHARGE NOTE ADULT - CARE PLAN
Principal Discharge DX:	Femur fracture, right  Goal:	improvement after surgery  Instructions for follow-up, activity and diet:	No strenuous activity, heavy lifting, driving, tub bathing, or returning to work until cleared by MD.  You may shower--dressing is waterproof.  Remove dressing after post op day 5, then leave incision open to air.  Follow up with Dr. Armenta, call office to schedule an appt within 10-14 days.  If you don't have a bowel movement by post op day 3, then take Milk of Magnesia (over the counter).  If no bowel movement by at least post op day 5, then use a Dulcolax suppository (over the counter) and/or a Fleets enema--if still no bowel movement, call your MD.  Contact your doctor if you experience: fever greater than 101.5, chills, chest pain, difficulty breathing, bleeding, redness or heat around the incision. Principal Discharge DX:	Femur fracture, right  Goal:	Improved right distal femur fracture healing from right hip nailing and right femur ORIF  Instructions for follow-up, activity and diet:	25% partial weight bearing on right leg with rolling walker and assistance.  Patient has aquacel dressings on right leg.  Keep on until follow up appointment.  Patient can sit in a shower chair to shower with assistance.  No strenuous activity, heavy lifting, driving, tub bathing, or returning to work until cleared by MD.  Follow up with Dr. Mccurdy, call office to schedule an appt within 10-14 days.  If you don't have a bowel movement by post op day 3, then take Milk of Magnesia (over the counter).  If no bowel movement by at least post op day 5, then use a Dulcolax suppository (over the counter) and/or a Fleets enema--if still no bowel movement, call your MD.  Contact your doctor if you experience: fever greater than 101.5, chills, chest pain, difficulty breathing, bleeding, redness or heat around the incision.  Continue enoxaparin (lovenox) 40mg injection daily until your follow up appointment in [place] with an orthopedist (approximately 3 weeks).

## 2017-11-01 NOTE — DISCHARGE NOTE ADULT - PLAN OF CARE
improvement after surgery No strenuous activity, heavy lifting, driving, tub bathing, or returning to work until cleared by MD.  You may shower--dressing is waterproof.  Remove dressing after post op day 5, then leave incision open to air.  Follow up with Dr. Armenta, call office to schedule an appt within 10-14 days.  If you don't have a bowel movement by post op day 3, then take Milk of Magnesia (over the counter).  If no bowel movement by at least post op day 5, then use a Dulcolax suppository (over the counter) and/or a Fleets enema--if still no bowel movement, call your MD.  Contact your doctor if you experience: fever greater than 101.5, chills, chest pain, difficulty breathing, bleeding, redness or heat around the incision. Improved right distal femur fracture healing from right hip nailing and right femur ORIF 25% partial weight bearing on right leg with rolling walker and assistance.  Patient has aquacel dressings on right leg.  Keep on until follow up appointment.  Patient can sit in a shower chair to shower with assistance.  No strenuous activity, heavy lifting, driving, tub bathing, or returning to work until cleared by MD.  Follow up with Dr. Mccurdy, call office to schedule an appt within 10-14 days.  If you don't have a bowel movement by post op day 3, then take Milk of Magnesia (over the counter).  If no bowel movement by at least post op day 5, then use a Dulcolax suppository (over the counter) and/or a Fleets enema--if still no bowel movement, call your MD.  Contact your doctor if you experience: fever greater than 101.5, chills, chest pain, difficulty breathing, bleeding, redness or heat around the incision.  Continue enoxaparin (lovenox) 40mg injection daily until your follow up appointment in [place] with an orthopedist (approximately 3 weeks).

## 2017-11-02 LAB
ANION GAP SERPL CALC-SCNC: 13 MMOL/L — SIGNIFICANT CHANGE UP (ref 5–17)
BASOPHILS NFR BLD AUTO: 0.1 % — SIGNIFICANT CHANGE UP (ref 0–2)
BUN SERPL-MCNC: 12 MG/DL — SIGNIFICANT CHANGE UP (ref 7–23)
CALCIUM SERPL-MCNC: 8.8 MG/DL — SIGNIFICANT CHANGE UP (ref 8.4–10.5)
CHLORIDE SERPL-SCNC: 99 MMOL/L — SIGNIFICANT CHANGE UP (ref 96–108)
CO2 SERPL-SCNC: 24 MMOL/L — SIGNIFICANT CHANGE UP (ref 22–31)
CREAT SERPL-MCNC: 0.52 MG/DL — SIGNIFICANT CHANGE UP (ref 0.5–1.3)
EXTRA GREEN TOP TUBE: SIGNIFICANT CHANGE UP
GLUCOSE SERPL-MCNC: 163 MG/DL — HIGH (ref 70–99)
HCT VFR BLD CALC: 37.6 % — SIGNIFICANT CHANGE UP (ref 34.5–45)
HCT VFR BLD CALC: 38.8 % — SIGNIFICANT CHANGE UP (ref 34.5–45)
HGB BLD-MCNC: 12.5 G/DL — SIGNIFICANT CHANGE UP (ref 11.5–15.5)
HGB BLD-MCNC: 13 G/DL — SIGNIFICANT CHANGE UP (ref 11.5–15.5)
LYMPHOCYTES # BLD AUTO: 3.7 % — LOW (ref 13–44)
MCHC RBC-ENTMCNC: 30.5 PG — SIGNIFICANT CHANGE UP (ref 27–34)
MCHC RBC-ENTMCNC: 30.6 PG — SIGNIFICANT CHANGE UP (ref 27–34)
MCHC RBC-ENTMCNC: 33.2 G/DL — SIGNIFICANT CHANGE UP (ref 32–36)
MCHC RBC-ENTMCNC: 33.5 G/DL — SIGNIFICANT CHANGE UP (ref 32–36)
MCV RBC AUTO: 91.1 FL — SIGNIFICANT CHANGE UP (ref 80–100)
MCV RBC AUTO: 91.9 FL — SIGNIFICANT CHANGE UP (ref 80–100)
MONOCYTES NFR BLD AUTO: 2.7 % — SIGNIFICANT CHANGE UP (ref 2–14)
NEUTROPHILS NFR BLD AUTO: 93.5 % — HIGH (ref 43–77)
PLATELET # BLD AUTO: 363 K/UL — SIGNIFICANT CHANGE UP (ref 150–400)
PLATELET # BLD AUTO: 384 K/UL — SIGNIFICANT CHANGE UP (ref 150–400)
POTASSIUM SERPL-MCNC: 4.2 MMOL/L — SIGNIFICANT CHANGE UP (ref 3.5–5.3)
POTASSIUM SERPL-SCNC: 4.2 MMOL/L — SIGNIFICANT CHANGE UP (ref 3.5–5.3)
RBC # BLD: 4.09 M/UL — SIGNIFICANT CHANGE UP (ref 3.8–5.2)
RBC # BLD: 4.26 M/UL — SIGNIFICANT CHANGE UP (ref 3.8–5.2)
RBC # FLD: 12.7 % — SIGNIFICANT CHANGE UP (ref 10.3–16.9)
RBC # FLD: 13.2 % — SIGNIFICANT CHANGE UP (ref 10.3–16.9)
RH IG SCN BLD-IMP: POSITIVE — SIGNIFICANT CHANGE UP
SODIUM SERPL-SCNC: 136 MMOL/L — SIGNIFICANT CHANGE UP (ref 135–145)
WBC # BLD: 16.8 K/UL — HIGH (ref 3.8–10.5)
WBC # BLD: 7.4 K/UL — SIGNIFICANT CHANGE UP (ref 3.8–10.5)
WBC # FLD AUTO: 16.8 K/UL — HIGH (ref 3.8–10.5)
WBC # FLD AUTO: 7.4 K/UL — SIGNIFICANT CHANGE UP (ref 3.8–10.5)

## 2017-11-02 PROCEDURE — 73552 X-RAY EXAM OF FEMUR 2/>: CPT | Mod: 26,RT

## 2017-11-02 RX ORDER — SENNA PLUS 8.6 MG/1
2 TABLET ORAL AT BEDTIME
Qty: 0 | Refills: 0 | Status: DISCONTINUED | OUTPATIENT
Start: 2017-11-02 | End: 2017-11-06

## 2017-11-02 RX ORDER — ENOXAPARIN SODIUM 100 MG/ML
30 INJECTION SUBCUTANEOUS EVERY 24 HOURS
Qty: 0 | Refills: 0 | Status: DISCONTINUED | OUTPATIENT
Start: 2017-11-02 | End: 2017-11-06

## 2017-11-02 RX ORDER — FERROUS SULFATE 325(65) MG
325 TABLET ORAL
Qty: 0 | Refills: 0 | Status: DISCONTINUED | OUTPATIENT
Start: 2017-11-02 | End: 2017-11-06

## 2017-11-02 RX ORDER — DIPHENHYDRAMINE HCL 50 MG
50 CAPSULE ORAL EVERY 6 HOURS
Qty: 0 | Refills: 0 | Status: DISCONTINUED | OUTPATIENT
Start: 2017-11-02 | End: 2017-11-06

## 2017-11-02 RX ORDER — DOCUSATE SODIUM 100 MG
100 CAPSULE ORAL THREE TIMES A DAY
Qty: 0 | Refills: 0 | Status: DISCONTINUED | OUTPATIENT
Start: 2017-11-02 | End: 2017-11-06

## 2017-11-02 RX ORDER — MAGNESIUM HYDROXIDE 400 MG/1
30 TABLET, CHEWABLE ORAL DAILY
Qty: 0 | Refills: 0 | Status: DISCONTINUED | OUTPATIENT
Start: 2017-11-02 | End: 2017-11-06

## 2017-11-02 RX ORDER — OXYCODONE HYDROCHLORIDE 5 MG/1
10 TABLET ORAL EVERY 4 HOURS
Qty: 0 | Refills: 0 | Status: DISCONTINUED | OUTPATIENT
Start: 2017-11-02 | End: 2017-11-06

## 2017-11-02 RX ORDER — FOLIC ACID 0.8 MG
1 TABLET ORAL DAILY
Qty: 0 | Refills: 0 | Status: DISCONTINUED | OUTPATIENT
Start: 2017-11-02 | End: 2017-11-06

## 2017-11-02 RX ORDER — ASCORBIC ACID 60 MG
500 TABLET,CHEWABLE ORAL
Qty: 0 | Refills: 0 | Status: DISCONTINUED | OUTPATIENT
Start: 2017-11-02 | End: 2017-11-06

## 2017-11-02 RX ORDER — ONDANSETRON 8 MG/1
4 TABLET, FILM COATED ORAL EVERY 6 HOURS
Qty: 0 | Refills: 0 | Status: DISCONTINUED | OUTPATIENT
Start: 2017-11-02 | End: 2017-11-06

## 2017-11-02 RX ORDER — FAMOTIDINE 10 MG/ML
20 INJECTION INTRAVENOUS EVERY 12 HOURS
Qty: 0 | Refills: 0 | Status: DISCONTINUED | OUTPATIENT
Start: 2017-11-02 | End: 2017-11-06

## 2017-11-02 RX ORDER — CEFAZOLIN SODIUM 1 G
2000 VIAL (EA) INJECTION EVERY 8 HOURS
Qty: 0 | Refills: 0 | Status: COMPLETED | OUTPATIENT
Start: 2017-11-02 | End: 2017-11-03

## 2017-11-02 RX ORDER — MORPHINE SULFATE 50 MG/1
2 CAPSULE, EXTENDED RELEASE ORAL EVERY 4 HOURS
Qty: 0 | Refills: 0 | Status: DISCONTINUED | OUTPATIENT
Start: 2017-11-02 | End: 2017-11-06

## 2017-11-02 RX ORDER — POLYETHYLENE GLYCOL 3350 17 G/17G
17 POWDER, FOR SOLUTION ORAL DAILY
Qty: 0 | Refills: 0 | Status: DISCONTINUED | OUTPATIENT
Start: 2017-11-02 | End: 2017-11-06

## 2017-11-02 RX ORDER — MORPHINE SULFATE 50 MG/1
2 CAPSULE, EXTENDED RELEASE ORAL
Qty: 0 | Refills: 0 | Status: DISCONTINUED | OUTPATIENT
Start: 2017-11-02 | End: 2017-11-03

## 2017-11-02 RX ORDER — OXYCODONE HYDROCHLORIDE 5 MG/1
5 TABLET ORAL EVERY 4 HOURS
Qty: 0 | Refills: 0 | Status: DISCONTINUED | OUTPATIENT
Start: 2017-11-02 | End: 2017-11-03

## 2017-11-02 RX ORDER — ACETAMINOPHEN 500 MG
650 TABLET ORAL EVERY 6 HOURS
Qty: 0 | Refills: 0 | Status: DISCONTINUED | OUTPATIENT
Start: 2017-11-02 | End: 2017-11-06

## 2017-11-02 RX ADMIN — FAMOTIDINE 20 MILLIGRAM(S): 10 INJECTION INTRAVENOUS at 18:44

## 2017-11-02 RX ADMIN — Medication 50 MILLIGRAM(S): at 21:53

## 2017-11-02 RX ADMIN — ONDANSETRON 4 MILLIGRAM(S): 8 TABLET, FILM COATED ORAL at 20:49

## 2017-11-02 RX ADMIN — Medication 500 MILLIGRAM(S): at 18:44

## 2017-11-02 RX ADMIN — Medication 100 MILLIGRAM(S): at 19:34

## 2017-11-02 NOTE — BRIEF OPERATIVE NOTE - PROCEDURE
<<-----Click on this checkbox to enter Procedure Open reduction and internal fixation (ORIF) of fracture of femur with plating  11/02/2017    Active  [facility]  Open reduction and internal fixation (ORIF) of fracture of femur using cable  11/02/2017    Active  [facility]  Open reduction and internal fixation (ORIF) of fracture of femur using retrograde intramedullary jackie  11/02/2017    Active  [facility]

## 2017-11-02 NOTE — PROGRESS NOTE ADULT - SUBJECTIVE AND OBJECTIVE BOX
Orthopaedics Post Op Check    Procedure: R femur ORIF  Surgeon: Kaz    Pt comfortable, without complaints  Denies CP, SOB, N/V, numbness/tingling     Vital Signs Last 24 Hrs  T(C): 36.3 (02 Nov 2017 18:30), Max: 36.7 (01 Nov 2017 20:41)  T(F): 97.3 (02 Nov 2017 18:30), Max: 98 (01 Nov 2017 20:41)  HR: 88 (02 Nov 2017 18:30) (66 - 93)  BP: 112/78 (02 Nov 2017 18:30) (11/64 - 155/71)  BP(mean): 75 (02 Nov 2017 17:40) (72 - 83)  RR: 16 (02 Nov 2017 18:30) (16 - 45)  SpO2: 98% (02 Nov 2017 18:30) (93% - 100%)  AVSS, NAD    Dressing C/D/I  General: Pt Alert and oriented     Pulses: +2 PT/DP b/l  Sensation: SILT b/l  Motor: 5/5 TA/GS/FHL/EHL                          12.5   16.8  )-----------( 384      ( 02 Nov 2017 17:06 )             37.6   11-02    136  |  99  |  12  ----------------------------<  163<H>  4.2   |  24  |  0.52    Ca    8.8      02 Nov 2017 17:05      Post op XR:    A/P: 67yFemale POD#0 s/p R femur orif  - Stable  - Pain Control  - DVT ppx: lovenox  - Post op abx: anvef  - PT, WBS: nwb  - F/U AM Labs

## 2017-11-02 NOTE — CONSULT NOTE ADULT - SUBJECTIVE AND OBJECTIVE BOX
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 She was in a changing room at a store when she twisted around her R leg the wrong way, felt a pop, and then noticed immediate swelling/deformity/pain. Denies any other injury/trauma, numbness/weakness.  Wup confirmed distal femoral fracture. She underwent biopsy-cytology and biopsy were negative for malignant cells.   She has documented history of osteoporosis but was never treated.       PAST MEDICAL & SURGICAL HISTORY:  Leiomyosarcoma    MEDICATIONS  (STANDING):  acetaminophen   Tablet. 975 milliGRAM(s) Oral every 8 hours  docusate sodium 100 milliGRAM(s) Oral three times a day  heparin  Injectable 5000 Unit(s) SubCutaneous every 12 hours  lactated ringers. 1000 milliLiter(s) IV Continuous <Continuous>  polyethylene glycol 3350 17 Gram(s) Oral daily  senna 2 Tablet(s) Oral at bedtime      Allergies    No Known Allergies    Intolerances      ROS:  Mild R thigh discomfort  The patient denies chest pain or SOB.  No nausea/vomiting/fevers/chills/night sweats.  No fatigue, headaches/dizziness.  Appetite is stable without weight loss.  No abdominal pain/diarrhea/constipation.  No melena or hematochezia.    No dysuria/hematuria.  No history of easy bruising/bleeding.  No gingival bleeding or epistaxis.     ROS is otherwise negative.    Physical exam:    Vital signs  Vital Signs Last 24 Hrs  T(C): 36.2 (11-02-17 @ 05:01), Max: 37.1 (11-01-17 @ 08:39)  T(F): 97.1 (11-02-17 @ 05:01), Max: 98.7 (11-01-17 @ 08:39)  HR: 83 (11-02-17 @ 05:01) (81 - 97)  BP: 132/83 (11-02-17 @ 05:01) (132/83 - 155/71)  BP(mean): --  RR: 16 (11-02-17 @ 05:01) (16 - 17)  SpO2: 100% (11-02-17 @ 05:01) (95% - 100%)  HEENT: PERRLA, pink mucosae  No palpable lymphadenopathy  Cardiovascular: Regular rhythm/rate, no murmurs, rubs, gallops  Respiratory: clear to asucultation B/L  Abdomen: soft, non tender, non distended, no palpable masses, no palpable hepatosplenomegaly  Extremities:  R thigh well healed surgical scar, distal thigh edema  Neuro: alert, awake and oriented x 3, no focal abnormalities  Skin: warm, no obvious lesions on visible skin    LABS:  CBC Full  -  ( 02 Nov 2017 07:30 )  WBC Count : 7.4 K/uL  Hemoglobin : 13.0 g/dL  Hematocrit : 38.8 %  Platelet Count - Automated : 363 K/uL  Mean Cell Volume : 91.1 fL  Mean Cell Hemoglobin : 30.5 pg  Mean Cell Hemoglobin Concentration : 33.5 g/dL  Auto Neutrophil # : x  Auto Lymphocyte # : x  Auto Monocyte # : x  Auto Eosinophil # : x  Auto Basophil # : x  Auto Neutrophil % : x  Auto Lymphocyte % : x  Auto Monocyte % : x  Auto Eosinophil % : x  Auto Basophil % : x    01 Nov 2017 11:11    139    |  101    |  11     ----------------------------<  118    3.8     |  25     |  0.54     Ca    9.8        01 Nov 2017 11:11        PERTINENT IMAGING STUDIES: reviewed    ASSESSMENT:    History of R thigh leiomyosarcoma in 1998  R femur fracture    PLAN:    Extensive discussion with the patient and her daughter  Distant history of leiomyosarcoma  S/p biopsy of the bone lesion (negative for malignancy)  The fracture is likely due to a combination of underlying osteoporosis and late radiation effect  She might benefit from osteoporosis treatment in the future  scheduled for stabilization surgery with Dr. Mccurdy today  We will review postop findings and pathology  All of their questions were answered    Thank you    Mary Ellen Campbell MD  [phone]
Patient is a 67y old  Female who presents with a chief complaint of R thigh pain (28 Oct 2017 00:13)      HPI:  66F PMH R thigh leiomyosarcoma s/p wide excision, radiation therapy in 2000 p/w R thigh pain s/p twisting injury.  Pt reports she was in a changing room at a store when she twisted around her R leg the wrong way, felt a pop, and then noticed immediate swelling/deformity/pain. Denies any other injury/trauma, numbness/weakness, any other CA hx. (28 Oct 2017 00:13)      PAST MEDICAL & SURGICAL HISTORY:  Leiomyosarcoma      FAMILY HISTORY:      SOCIAL HISTORY:  Smoking Status: [ ] Current, [ ] Former, [ ] Never  Pack Years:    MEDICATIONS:  Pulmonary:    Antimicrobials:    Anticoagulants:    Onc:    GI/:  docusate sodium 100 milliGRAM(s) Oral three times a day    Endocrine:    Cardiac:    Other Medications:  acetaminophen   Tablet 650 milliGRAM(s) Oral every 6 hours PRN  acetaminophen   Tablet. 650 milliGRAM(s) Oral every 6 hours PRN  lactated ringers. 1000 milliLiter(s) IV Continuous <Continuous>  metoclopramide Injectable 10 milliGRAM(s) IV Push every 6 hours PRN  morphine  - Injectable 4 milliGRAM(s) IV Push every 4 hours PRN  oxyCODONE    IR 10 milliGRAM(s) Oral every 4 hours PRN  zaleplon 5 milliGRAM(s) Oral at bedtime PRN      Allergies    No Known Allergies    Intolerances        Vital Signs Last 24 Hrs  T(C): 36.6 (28 Oct 2017 08:40), Max: 36.7 (27 Oct 2017 16:14)  T(F): 97.8 (28 Oct 2017 08:40), Max: 98.1 (27 Oct 2017 16:14)  HR: 89 (28 Oct 2017 08:40) (88 - 95)  BP: 124/79 (28 Oct 2017 08:40) (114/73 - 148/85)  BP(mean): --  RR: 14 (28 Oct 2017 08:40) (14 - 17)  SpO2: 98% (28 Oct 2017 08:40) (97% - 99%)        LABS:      CBC Full  -  ( 28 Oct 2017 06:16 )  WBC Count : 11.9 K/uL  Hemoglobin : 12.5 g/dL  Hematocrit : 38.2 %  Platelet Count - Automated : 329 K/uL  Mean Cell Volume : 91.8 fL  Mean Cell Hemoglobin : 30.0 pg  Mean Cell Hemoglobin Concentration : 32.7 g/dL  Auto Neutrophil # : x  Auto Lymphocyte # : x  Auto Monocyte # : x  Auto Eosinophil # : x  Auto Basophil # : x  Auto Neutrophil % : 71.8 %  Auto Lymphocyte % : 18.9 %  Auto Monocyte % : 8.6 %  Auto Eosinophil % : 0.4 %  Auto Basophil % : 0.3 %    10-28    139  |  102  |  9   ----------------------------<  103<H>  3.6   |  24  |  0.55    Ca    9.1      28 Oct 2017 06:16  Mg     2.0     10-27    TPro  7.9  /  Alb  4.5  /  TBili  0.6  /  DBili  x   /  AST  16  /  ALT  17  /  AlkPhos  82  10-27    PT/INR - ( 27 Oct 2017 19:07 )   PT: 11.7 sec;   INR: 1.05          PTT - ( 27 Oct 2017 19:07 )  PTT:27.9 sec      Urinalysis Basic - ( 28 Oct 2017 07:11 )    Color: Yellow / Appearance: Clear / SG: <=1.005 / pH: x  Gluc: x / Ketone: >=80 mg/dL  / Bili: Negative / Urobili: 0.2 E.U./dL   Blood: x / Protein: NEGATIVE mg/dL / Nitrite: NEGATIVE   Leuk Esterase: Small / RBC: < 5 /HPF / WBC 5-10 /HPF   Sq Epi: x / Non Sq Epi: 0-5 /HPF / Bacteria: Present /HPF          EKG RSR Normla  CXR NAP        RADIOLOGY & ADDITIONAL STUDIES (The following images were personally reviewed):

## 2017-11-02 NOTE — PROGRESS NOTE ADULT - SUBJECTIVE AND OBJECTIVE BOX
S: Patient seen and examined. Pt. doing well, Pain endorsed but controlled. No acute events overnight.    Vital Signs Last 24 Hrs  T(C): 36.2 (02 Nov 2017 05:01), Max: 36.7 (01 Nov 2017 16:09)  T(F): 97.1 (02 Nov 2017 05:01), Max: 98 (01 Nov 2017 16:09)  HR: 83 (02 Nov 2017 05:01) (83 - 97)  BP: 132/83 (02 Nov 2017 05:01) (132/83 - 155/71)  BP(mean): --  RR: 16 (02 Nov 2017 05:01) (16 - 17)  SpO2: 100% (02 Nov 2017 05:01) (95% - 100%)    NAD, AOx3, comfortable  Motor: 5/5 GS/TA/EHL/FHL    Sensation: SILT   Pulses: WWP, DP/PT 2+                            13.0   7.4   )-----------( 363      ( 02 Nov 2017 07:30 )             38.8                          A/P:  67y Female w/ R femur pathologic fx    -Stable  -For OR today  -Pain Control  -PT/NWB in [place]  -DVT ppx: hold  -NPO  -f/u AM labs  -Dispo: pending      Armando Gill MD, PGY-2  [phone]

## 2017-11-02 NOTE — PROGRESS NOTE ADULT - SUBJECTIVE AND OBJECTIVE BOX
68 y/o female with right distal femur pathologic fracture.    SUBJECTIVE: Patient seen and examined.     Pain:  Well controlled. Pt evaluated by heme/onc this AM. Pt and family amenable to surgery today, ready for OR this AM.    OBJECTIVE:     Vital Signs Last 24 Hrs  T(C): 36.1 (02 Nov 2017 09:15), Max: 36.7 (01 Nov 2017 16:09)  T(F): 96.9 (02 Nov 2017 09:15), Max: 98 (01 Nov 2017 16:09)  HR: 80 (02 Nov 2017 09:15) (80 - 97)  BP: 133/74 (02 Nov 2017 09:15) (132/83 - 155/71)  BP(mean): --  RR: 16 (02 Nov 2017 09:15) (16 - 17)  SpO2: 97% (02 Nov 2017 09:15) (95% - 100%)    Affected extremity:          Dressing: clean/dry/intact, right LE in KI         Sensation: intact and equal to light touch distally         Motor exam:  5 / 5 EHL/FHL/GS/TA bilaterally         warm, well-perfused; capillary refill < 3 seconds              I&O's Detail    01 Nov 2017 07:01  -  02 Nov 2017 07:00  --------------------------------------------------------  IN:  Total IN: 0 mL    OUT:    Voided: 1550 mL  Total OUT: 1550 mL    Total NET: -1550 mL      02 Nov 2017 07:01  -  02 Nov 2017 10:43  --------------------------------------------------------  IN:    lactated ringers.: 160 mL  Total IN: 160 mL    OUT:    Voided: 300 mL  Total OUT: 300 mL    Total NET: -140 mL          LABS:                        13.0   7.4   )-----------( 363      ( 02 Nov 2017 07:30 )             38.8     11-01    139  |  101  |  11  ----------------------------<  118<H>  3.8   |  25  |  0.54    Ca    9.8      01 Nov 2017 11:11            MEDICATIONS:    acetaminophen   Tablet 650 milliGRAM(s) Oral every 6 hours PRN  acetaminophen   Tablet. 975 milliGRAM(s) Oral every 8 hours  metoclopramide Injectable 10 milliGRAM(s) IV Push every 6 hours PRN  morphine  - Injectable 2 milliGRAM(s) IV Push every 4 hours PRN  oxyCODONE    IR 5 milliGRAM(s) Oral every 4 hours PRN  oxyCODONE    IR 10 milliGRAM(s) Oral every 4 hours PRN  zaleplon 5 milliGRAM(s) Oral at bedtime PRN    heparin  Injectable 5000 Unit(s) SubCutaneous every 12 hours        ASSESSMENT AND PLAN:     68 y/o female with right distal femur pathologic fracture    OR today  Analgesic pain control  DVT prophylaxis: held for surgery  Weight Bearing Status:  NWB in   Disposition: pending PT evaluation

## 2017-11-03 LAB
ANION GAP SERPL CALC-SCNC: 11 MMOL/L — SIGNIFICANT CHANGE UP (ref 5–17)
BUN SERPL-MCNC: 9 MG/DL — SIGNIFICANT CHANGE UP (ref 7–23)
CALCIUM SERPL-MCNC: 8.7 MG/DL — SIGNIFICANT CHANGE UP (ref 8.4–10.5)
CHLORIDE SERPL-SCNC: 100 MMOL/L — SIGNIFICANT CHANGE UP (ref 96–108)
CO2 SERPL-SCNC: 25 MMOL/L — SIGNIFICANT CHANGE UP (ref 22–31)
CREAT SERPL-MCNC: 0.56 MG/DL — SIGNIFICANT CHANGE UP (ref 0.5–1.3)
GLUCOSE SERPL-MCNC: 129 MG/DL — HIGH (ref 70–99)
HCT VFR BLD CALC: 30.4 % — LOW (ref 34.5–45)
HGB BLD-MCNC: 10.2 G/DL — LOW (ref 11.5–15.5)
MCHC RBC-ENTMCNC: 30.4 PG — SIGNIFICANT CHANGE UP (ref 27–34)
MCHC RBC-ENTMCNC: 33.6 G/DL — SIGNIFICANT CHANGE UP (ref 32–36)
MCV RBC AUTO: 90.7 FL — SIGNIFICANT CHANGE UP (ref 80–100)
PLATELET # BLD AUTO: 330 K/UL — SIGNIFICANT CHANGE UP (ref 150–400)
POTASSIUM SERPL-MCNC: 3.6 MMOL/L — SIGNIFICANT CHANGE UP (ref 3.5–5.3)
POTASSIUM SERPL-SCNC: 3.6 MMOL/L — SIGNIFICANT CHANGE UP (ref 3.5–5.3)
RBC # BLD: 3.35 M/UL — LOW (ref 3.8–5.2)
RBC # FLD: 12.9 % — SIGNIFICANT CHANGE UP (ref 10.3–16.9)
SODIUM SERPL-SCNC: 136 MMOL/L — SIGNIFICANT CHANGE UP (ref 135–145)
WBC # BLD: 9.1 K/UL — SIGNIFICANT CHANGE UP (ref 3.8–10.5)
WBC # FLD AUTO: 9.1 K/UL — SIGNIFICANT CHANGE UP (ref 3.8–10.5)

## 2017-11-03 PROCEDURE — 99233 SBSQ HOSP IP/OBS HIGH 50: CPT | Mod: GC

## 2017-11-03 RX ORDER — ACETAMINOPHEN 500 MG
650 TABLET ORAL EVERY 6 HOURS
Qty: 0 | Refills: 0 | Status: DISCONTINUED | OUTPATIENT
Start: 2017-11-03 | End: 2017-11-06

## 2017-11-03 RX ORDER — SODIUM CHLORIDE 9 MG/ML
1000 INJECTION, SOLUTION INTRAVENOUS
Qty: 0 | Refills: 0 | Status: DISCONTINUED | OUTPATIENT
Start: 2017-11-03 | End: 2017-11-06

## 2017-11-03 RX ADMIN — MORPHINE SULFATE 2 MILLIGRAM(S): 50 CAPSULE, EXTENDED RELEASE ORAL at 02:14

## 2017-11-03 RX ADMIN — Medication 325 MILLIGRAM(S): at 06:06

## 2017-11-03 RX ADMIN — Medication 650 MILLIGRAM(S): at 23:47

## 2017-11-03 RX ADMIN — MORPHINE SULFATE 2 MILLIGRAM(S): 50 CAPSULE, EXTENDED RELEASE ORAL at 02:30

## 2017-11-03 RX ADMIN — Medication 650 MILLIGRAM(S): at 17:25

## 2017-11-03 RX ADMIN — Medication 650 MILLIGRAM(S): at 11:53

## 2017-11-03 RX ADMIN — Medication 500 MILLIGRAM(S): at 17:25

## 2017-11-03 RX ADMIN — Medication 100 MILLIGRAM(S): at 03:00

## 2017-11-03 RX ADMIN — FAMOTIDINE 20 MILLIGRAM(S): 10 INJECTION INTRAVENOUS at 05:51

## 2017-11-03 RX ADMIN — Medication 650 MILLIGRAM(S): at 18:10

## 2017-11-03 RX ADMIN — OXYCODONE HYDROCHLORIDE 5 MILLIGRAM(S): 5 TABLET ORAL at 07:06

## 2017-11-03 RX ADMIN — Medication 1 TABLET(S): at 11:52

## 2017-11-03 RX ADMIN — Medication 650 MILLIGRAM(S): at 23:07

## 2017-11-03 RX ADMIN — Medication 100 MILLIGRAM(S): at 20:29

## 2017-11-03 RX ADMIN — Medication 325 MILLIGRAM(S): at 11:52

## 2017-11-03 RX ADMIN — SODIUM CHLORIDE 80 MILLILITER(S): 9 INJECTION, SOLUTION INTRAVENOUS at 00:36

## 2017-11-03 RX ADMIN — Medication 1 MILLIGRAM(S): at 11:52

## 2017-11-03 RX ADMIN — Medication 100 MILLIGRAM(S): at 11:53

## 2017-11-03 RX ADMIN — OXYCODONE HYDROCHLORIDE 5 MILLIGRAM(S): 5 TABLET ORAL at 08:00

## 2017-11-03 RX ADMIN — ENOXAPARIN SODIUM 30 MILLIGRAM(S): 100 INJECTION SUBCUTANEOUS at 06:00

## 2017-11-03 RX ADMIN — Medication 500 MILLIGRAM(S): at 05:51

## 2017-11-03 RX ADMIN — ONDANSETRON 4 MILLIGRAM(S): 8 TABLET, FILM COATED ORAL at 05:55

## 2017-11-03 RX ADMIN — Medication 50 MILLIGRAM(S): at 22:00

## 2017-11-03 RX ADMIN — FAMOTIDINE 20 MILLIGRAM(S): 10 INJECTION INTRAVENOUS at 17:25

## 2017-11-03 RX ADMIN — Medication 325 MILLIGRAM(S): at 17:25

## 2017-11-03 NOTE — PHYSICAL THERAPY INITIAL EVALUATION ADULT - PERTINENT HX OF CURRENT PROBLEM, REHAB EVAL
Patient daniel  67 year old female s/p right femur ORIF due to pathological fracture. Mechanism of injury was twisting while in a dressing room. Of note, patient is in [place] visiting her daughter. Patient lives in [place].

## 2017-11-03 NOTE — PROGRESS NOTE ADULT - SUBJECTIVE AND OBJECTIVE BOX
Interval Events: reviewed  Patient seen and examined at bedside.    Patient is a 67y old  Female who presents with a chief complaint of R thigh pain (01 Nov 2017 10:04)  she walked today and she is doing well    PAST MEDICAL & SURGICAL HISTORY:  Leiomyosarcoma      MEDICATIONS:  Pulmonary:  diphenhydrAMINE   Injectable 50 milliGRAM(s) IntraMuscular every 6 hours PRN    Antimicrobials:    Anticoagulants:  enoxaparin Injectable 30 milliGRAM(s) SubCutaneous every 24 hours    Cardiac:      Allergies    No Known Allergies    Intolerances        Vital Signs Last 24 Hrs  T(C): 36.9 (03 Nov 2017 20:17), Max: 37.5 (03 Nov 2017 08:27)  T(F): 98.4 (03 Nov 2017 20:17), Max: 99.5 (03 Nov 2017 08:27)  HR: 100 (03 Nov 2017 20:17) (86 - 100)  BP: 104/70 (03 Nov 2017 20:17) (102/68 - 117/56)  BP(mean): --  RR: 15 (03 Nov 2017 20:17) (14 - 16)  SpO2: 98% (03 Nov 2017 20:17) (98% - 100%)    11-02 @ 07:01  -  11-03 @ 07:00  --------------------------------------------------------  IN: 1310 mL / OUT: 1350 mL / NET: -40 mL    11-03 @ 07:01 - 11-03 @ 22:42  --------------------------------------------------------  IN: 840 mL / OUT: 1860 mL / NET: -1020 mL          LABS:      CBC Full  -  ( 03 Nov 2017 07:06 )  WBC Count : 9.1 K/uL  Hemoglobin : 10.2 g/dL  Hematocrit : 30.4 %  Platelet Count - Automated : 330 K/uL  Mean Cell Volume : 90.7 fL  Mean Cell Hemoglobin : 30.4 pg  Mean Cell Hemoglobin Concentration : 33.6 g/dL  Auto Neutrophil # : x  Auto Lymphocyte # : x  Auto Monocyte # : x  Auto Eosinophil # : x  Auto Basophil # : x  Auto Neutrophil % : x  Auto Lymphocyte % : x  Auto Monocyte % : x  Auto Eosinophil % : x  Auto Basophil % : x    11-03    136  |  100  |  9   ----------------------------<  129<H>  3.6   |  25  |  0.56    Ca    8.7      03 Nov 2017 07:06                          RADIOLOGY & ADDITIONAL STUDIES (The following images were personally reviewed):  Mota:                            No  Urine output:               Yes          DVT prophylaxis:         Yes         Flattus:                          Yes        Bowel movement:        No

## 2017-11-03 NOTE — PHYSICAL THERAPY INITIAL EVALUATION ADULT - MANUAL MUSCLE TESTING RESULTS, REHAB EVAL
B UE and B LE >3+/5 upon functional assessment against gravity. (Right hip and knee limited by pain, however strength functional for ambulation).

## 2017-11-03 NOTE — PHYSICAL THERAPY INITIAL EVALUATION ADULT - ADDITIONAL COMMENTS
Patient states that at home in [place], she lives in a two level home with 3 small steps to enter. Will be able to live on main level.

## 2017-11-03 NOTE — PHYSICAL THERAPY INITIAL EVALUATION ADULT - ACTIVE RANGE OF MOTION EXAMINATION, REHAB EVAL
Left LE Active ROM was WFL (within functional limits)/Right LE motion WFL, hip limited by pain, however functional for ambulation./bilateral upper extremity Active ROM was WFL (within functional limits)

## 2017-11-03 NOTE — OCCUPATIONAL THERAPY INITIAL EVALUATION ADULT - GENERAL OBSERVATIONS, REHAB EVAL
Patient cleared for Occupational Therapy by SHERLEY Plunkett. Patient received supine in semi-lewis position in non-acute distress, +IV heplock, + right femur dressing C/D/I, + bilateral sequential compression devices. Patient reports right lower extremity pain rated 4-5/10.

## 2017-11-03 NOTE — PROGRESS NOTE ADULT - SUBJECTIVE AND OBJECTIVE BOX
ORTHO NOTE    [x] Pt seen/examined.  [x] Pt without any complaints/in NAD. Pain is well controlled. N/V resolved from last night. Denies C/P, SOB, N/V, tactile fevers.  POD #1 s/p right distal femur ORIF    [ ] Pt complains of:      ROS: [ ] Fever  [ ] Chills  [ ] CP [ ] SOB [ ] Dysnea  [ ] Palpitations [ ] Cough [ ] N/V/C/D [ ] Paresthia [ ] Other     [x] ROS  otherwise negative    .    PHYSICAL EXAM:   Dressing C/D/I - proximal gauze saturated, being changed by nursing staff   EHL/FHL/TA/GS 5/5 motor strength left lower extremity  SLT in tact to distal left lower extremity   Skin warm and well perfused, DP pulses 2+    Vital Signs Last 24 Hrs  T(C): 37.5 (03 Nov 2017 08:27), Max: 37.5 (03 Nov 2017 08:27)  T(F): 99.5 (03 Nov 2017 08:27), Max: 99.5 (03 Nov 2017 08:27)  HR: 92 (03 Nov 2017 08:27) (66 - 98)  BP: 110/55 (03 Nov 2017 08:27) (11/64 - 128/57)  BP(mean): 75 (02 Nov 2017 17:40) (72 - 83)  RR: 14 (03 Nov 2017 08:27) (14 - 45)  SpO2: 100% (03 Nov 2017 08:27) (93% - 100%)    I&O's Detail    02 Nov 2017 07:01  -  03 Nov 2017 07:00  --------------------------------------------------------  IN:    IV PiggyBack: 50 mL    lactated ringers.: 880 mL    lactated ringers.: 160 mL    Oral Fluid: 220 mL  Total IN: 1310 mL    OUT:    Voided: 1350 mL  Total OUT: 1350 mL    Total NET: -40 mL          LABS                        10.2   9.1   )-----------( 330      ( 03 Nov 2017 07:06 )             30.4                                11-03    136  |  100  |  9   ----------------------------<  129<H>  3.6   |  25  |  0.56    Ca    8.7      03 Nov 2017 07:06        [ ] Other Labs  [ ] AM labs ordered     ASSESSMENT/PLAN: POD #1 s/p right distal femur ORIF    CONTINUE:          [ ] PT     [ ] DVT PPX- Lovenox 30 QD    [ ] Pain Mgt    [ ] Dispo plan- for [facility]    [x] Pt seen/examined.  [x] Pt without any complaints/in NAD. Pain is well controlled. N/V resolved from last night. Denies C/P, SOB, N/V, tactile fevers.  POD #1 s/p right distal femur ORIF    [ ] Pt complains of:      ROS: [ ] Fever  [ ] Chills  [ ] CP [ ] SOB [ ] Dysnea  [ ] Palpitations [ ] Cough [ ] N/V/C/D [ ] Paresthia [ ] Other     [x] ROS  otherwise negative    .    PHYSICAL EXAM:   Dressing C/D/I - proximal gauze saturated, being changed by nursing staff   EHL/FHL/TA/GS 5/5 motor strength left lower extremity  SLT in tact to distal left lower extremity   Skin warm and well perfused, DP pulses 2+    Vital Signs Last 24 Hrs  T(C): 37.5 (03 Nov 2017 08:27), Max: 37.5 (03 Nov 2017 08:27)  T(F): 99.5 (03 Nov 2017 08:27), Max: 99.5 (03 Nov 2017 08:27)  HR: 92 (03 Nov 2017 08:27) (66 - 98)  BP: 110/55 (03 Nov 2017 08:27) (11/64 - 128/57)  BP(mean): 75 (02 Nov 2017 17:40) (72 - 83)  RR: 14 (03 Nov 2017 08:27) (14 - 45)  SpO2: 100% (03 Nov 2017 08:27) (93% - 100%)    I&O's Detail    02 Nov 2017 07:01  -  03 Nov 2017 07:00  --------------------------------------------------------  IN:    IV PiggyBack: 50 mL    lactated ringers.: 880 mL    lactated ringers.: 160 mL    Oral Fluid: 220 mL  Total IN: 1310 mL    OUT:    Voided: 1350 mL  Total OUT: 1350 mL    Total NET: -40 mL          LABS                        10.2   9.1   )-----------( 330      ( 03 Nov 2017 07:06 )             30.4                                11-03    136  |  100  |  9   ----------------------------<  129<H>  3.6   |  25  |  0.56    Ca    8.7      03 Nov 2017 07:06        [ ] Other Labs  [ ] AM labs ordered     ASSESSMENT/PLAN: POD #1 s/p right distal femur ORIF    CONTINUE:          [ ] PT, NWB    [ ] DVT PPX- Lovenox 30 QD    [ ] Pain Mgt    [ ] Dispo plan- for [facility]    [x] Pt seen/examined.  [x] Pt without any complaints/in NAD. Pain is well controlled. N/V resolved from last night. Denies C/P, SOB, N/V, tactile fevers.  POD #1 s/p right distal femur ORIF    [ ] Pt complains of:      ROS: [ ] Fever  [ ] Chills  [ ] CP [ ] SOB [ ] Dysnea  [ ] Palpitations [ ] Cough [ ] N/V/C/D [ ] Paresthia [ ] Other     [x] ROS  otherwise negative    .    PHYSICAL EXAM:   Dressing C/D/I - proximal gauze saturated, being changed by nursing staff   EHL/FHL/TA/GS 5/5 motor strength left lower extremity  SLT in tact to distal left lower extremity   Skin warm and well perfused, DP pulses 2+    Vital Signs Last 24 Hrs  T(C): 37.5 (03 Nov 2017 08:27), Max: 37.5 (03 Nov 2017 08:27)  T(F): 99.5 (03 Nov 2017 08:27), Max: 99.5 (03 Nov 2017 08:27)  HR: 92 (03 Nov 2017 08:27) (66 - 98)  BP: 110/55 (03 Nov 2017 08:27) (11/64 - 128/57)  BP(mean): 75 (02 Nov 2017 17:40) (72 - 83)  RR: 14 (03 Nov 2017 08:27) (14 - 45)  SpO2: 100% (03 Nov 2017 08:27) (93% - 100%)    I&O's Detail    02 Nov 2017 07:01  -  03 Nov 2017 07:00  --------------------------------------------------------  IN:    IV PiggyBack: 50 mL    lactated ringers.: 880 mL    lactated ringers.: 160 mL    Oral Fluid: 220 mL  Total IN: 1310 mL    OUT:    Voided: 1350 mL  Total OUT: 1350 mL    Total NET: -40 mL          LABS                        10.2   9.1   )-----------( 330      ( 03 Nov 2017 07:06 )             30.4                                11-03    136  |  100  |  9   ----------------------------<  129<H>  3.6   |  25  |  0.56    Ca    8.7      03 Nov 2017 07:06        [ ] Other Labs  [ ] AM labs ordered     ASSESSMENT/PLAN: POD #1 s/p right distal femur ORIF    CONTINUE:          [ ] PT, PWB 25% RLE    [ ] DVT PPX- Lovenox 30 QD    [ ] Pain Mgt    [ ] Dispo plan- for [facility]

## 2017-11-03 NOTE — PROGRESS NOTE ADULT - SUBJECTIVE AND OBJECTIVE BOX
Orthopaedics Post Op Check    Procedure: R femur ORIF  Surgeon: Kaz    Pt comfortable, without complaints  Denies CP, SOB, N/V, numbness/tingling     Vital Signs Last 24 Hrs  T(C): 37.5 (03 Nov 2017 08:27), Max: 37.5 (03 Nov 2017 08:27)  T(F): 99.5 (03 Nov 2017 08:27), Max: 99.5 (03 Nov 2017 08:27)  HR: 92 (03 Nov 2017 08:27) (66 - 98)  BP: 110/55 (03 Nov 2017 08:27) (11/64 - 133/74)  BP(mean): 75 (02 Nov 2017 17:40) (72 - 83)  RR: 14 (03 Nov 2017 08:27) (14 - 45)  SpO2: 100% (03 Nov 2017 08:27) (93% - 100%)  AVSS, NAD    Dressing C/D/I  General: Pt Alert and oriented     Pulses: +2 PT/DP b/l  Sensation: SILT b/l  Motor: 5/5 TA/GS/FHL/EHL    LABS:    11-03    136  |  100  |  9   ----------------------------<  129<H>  3.6   |  25  |  0.56    Ca    8.7      03 Nov 2017 07:06                          10.2   9.1   )-----------( 330      ( 03 Nov 2017 07:06 )             30.4     CAPILLARY BLOOD GLUCOSE        A/P: 67yFemale POD#1 s/p R femur orif  - Stable  - Pain Control  - DVT ppx: lovenox  - Post op abx: anvef  - PT, WBS: nwb  - F/U AM Labs  -dispo: pending

## 2017-11-03 NOTE — OCCUPATIONAL THERAPY INITIAL EVALUATION ADULT - PERTINENT HX OF CURRENT PROBLEM, REHAB EVAL
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  Pt reports she was in a changing room at a store when she twisted around her R leg the wrong way, felt a pop, and then noticed immediate swelling/deformity/pain. S/P open reduction and internal fixation (ORIF) of fracture of femur using retrograde intramedullary jackie  11/02/2017. 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  Pt reports she was in a changing room at a store when she twisted around her R leg the wrong way, felt a pop, and then noticed immediate swelling/deformity/pain. S/P open reduction and internal fixation (ORIF) of fracture of femur using retrograde intramedullary jackie 11/02/2017.

## 2017-11-04 LAB
ANION GAP SERPL CALC-SCNC: 12 MMOL/L — SIGNIFICANT CHANGE UP (ref 5–17)
BUN SERPL-MCNC: 9 MG/DL — SIGNIFICANT CHANGE UP (ref 7–23)
CALCIUM SERPL-MCNC: 9.1 MG/DL — SIGNIFICANT CHANGE UP (ref 8.4–10.5)
CHLORIDE SERPL-SCNC: 100 MMOL/L — SIGNIFICANT CHANGE UP (ref 96–108)
CO2 SERPL-SCNC: 25 MMOL/L — SIGNIFICANT CHANGE UP (ref 22–31)
CREAT SERPL-MCNC: 0.55 MG/DL — SIGNIFICANT CHANGE UP (ref 0.5–1.3)
GLUCOSE SERPL-MCNC: 212 MG/DL — HIGH (ref 70–99)
HCT VFR BLD CALC: 29.4 % — LOW (ref 34.5–45)
HGB BLD-MCNC: 9.5 G/DL — LOW (ref 11.5–15.5)
MCHC RBC-ENTMCNC: 29.8 PG — SIGNIFICANT CHANGE UP (ref 27–34)
MCHC RBC-ENTMCNC: 32.3 G/DL — SIGNIFICANT CHANGE UP (ref 32–36)
MCV RBC AUTO: 92.2 FL — SIGNIFICANT CHANGE UP (ref 80–100)
PLATELET # BLD AUTO: 321 K/UL — SIGNIFICANT CHANGE UP (ref 150–400)
POTASSIUM SERPL-MCNC: 4 MMOL/L — SIGNIFICANT CHANGE UP (ref 3.5–5.3)
POTASSIUM SERPL-SCNC: 4 MMOL/L — SIGNIFICANT CHANGE UP (ref 3.5–5.3)
RBC # BLD: 3.19 M/UL — LOW (ref 3.8–5.2)
RBC # FLD: 13.3 % — SIGNIFICANT CHANGE UP (ref 10.3–16.9)
SODIUM SERPL-SCNC: 137 MMOL/L — SIGNIFICANT CHANGE UP (ref 135–145)
WBC # BLD: 10.4 K/UL — SIGNIFICANT CHANGE UP (ref 3.8–10.5)
WBC # FLD AUTO: 10.4 K/UL — SIGNIFICANT CHANGE UP (ref 3.8–10.5)

## 2017-11-04 RX ADMIN — Medication 650 MILLIGRAM(S): at 18:08

## 2017-11-04 RX ADMIN — Medication 10 MILLIGRAM(S): at 06:30

## 2017-11-04 RX ADMIN — ENOXAPARIN SODIUM 30 MILLIGRAM(S): 100 INJECTION SUBCUTANEOUS at 05:20

## 2017-11-04 RX ADMIN — Medication 650 MILLIGRAM(S): at 12:35

## 2017-11-04 RX ADMIN — Medication 325 MILLIGRAM(S): at 18:03

## 2017-11-04 RX ADMIN — Medication 500 MILLIGRAM(S): at 05:20

## 2017-11-04 RX ADMIN — Medication 325 MILLIGRAM(S): at 12:23

## 2017-11-04 RX ADMIN — FAMOTIDINE 20 MILLIGRAM(S): 10 INJECTION INTRAVENOUS at 05:20

## 2017-11-04 RX ADMIN — Medication 1 MILLIGRAM(S): at 12:23

## 2017-11-04 RX ADMIN — Medication 650 MILLIGRAM(S): at 06:00

## 2017-11-04 RX ADMIN — Medication 1 TABLET(S): at 12:23

## 2017-11-04 RX ADMIN — Medication 325 MILLIGRAM(S): at 05:20

## 2017-11-04 RX ADMIN — Medication 650 MILLIGRAM(S): at 19:08

## 2017-11-04 RX ADMIN — Medication 100 MILLIGRAM(S): at 20:28

## 2017-11-04 RX ADMIN — Medication 500 MILLIGRAM(S): at 18:03

## 2017-11-04 RX ADMIN — Medication 100 MILLIGRAM(S): at 05:21

## 2017-11-04 RX ADMIN — Medication 650 MILLIGRAM(S): at 11:35

## 2017-11-04 RX ADMIN — FAMOTIDINE 20 MILLIGRAM(S): 10 INJECTION INTRAVENOUS at 18:03

## 2017-11-04 RX ADMIN — Medication 650 MILLIGRAM(S): at 05:21

## 2017-11-04 RX ADMIN — ONDANSETRON 4 MILLIGRAM(S): 8 TABLET, FILM COATED ORAL at 07:07

## 2017-11-04 NOTE — PROGRESS NOTE ADULT - SUBJECTIVE AND OBJECTIVE BOX
S: Patient seen and examined. Pt. doing well, Pain endorsed but controlled. No acute events overnight.    Vital Signs Last 24 Hrs  T(C): 37.7 (04 Nov 2017 04:44), Max: 37.7 (04 Nov 2017 04:44)  T(F): 99.8 (04 Nov 2017 04:44), Max: 99.8 (04 Nov 2017 04:44)  HR: 109 (04 Nov 2017 04:44) (86 - 109)  BP: 99/65 (04 Nov 2017 04:44) (99/65 - 110/55)  BP(mean): --  RR: 16 (04 Nov 2017 04:44) (14 - 16)  SpO2: 98% (04 Nov 2017 04:44) (98% - 100%)    NAD, AOx3, comfortable  Motor: 5/5 GS/TA/EHL/FHL    Sensation: SILT   Pulses: WWP, DP/PT 2+    Dressing: C/D/I                          10.2   9.1   )-----------( 330      ( 03 Nov 2017 07:06 )             30.4         A/P:  67y Female s/p  R CMN/ORIF  , POD# 2     -Stable  -Pain Control  -PT/PWB 25%  -DVT ppx: LVX  -Advance diet as tolerated  -f/u AM labs  -Dispo: VICKIE Gill MD, PGY-2  [phone]

## 2017-11-05 RX ADMIN — Medication 500 MILLIGRAM(S): at 05:35

## 2017-11-05 RX ADMIN — ONDANSETRON 4 MILLIGRAM(S): 8 TABLET, FILM COATED ORAL at 06:10

## 2017-11-05 RX ADMIN — Medication 325 MILLIGRAM(S): at 05:35

## 2017-11-05 RX ADMIN — FAMOTIDINE 20 MILLIGRAM(S): 10 INJECTION INTRAVENOUS at 17:02

## 2017-11-05 RX ADMIN — Medication 650 MILLIGRAM(S): at 13:30

## 2017-11-05 RX ADMIN — Medication 650 MILLIGRAM(S): at 21:50

## 2017-11-05 RX ADMIN — Medication 1 MILLIGRAM(S): at 12:35

## 2017-11-05 RX ADMIN — Medication 325 MILLIGRAM(S): at 12:35

## 2017-11-05 RX ADMIN — Medication 650 MILLIGRAM(S): at 05:38

## 2017-11-05 RX ADMIN — Medication 100 MILLIGRAM(S): at 21:02

## 2017-11-05 RX ADMIN — Medication 650 MILLIGRAM(S): at 21:05

## 2017-11-05 RX ADMIN — POLYETHYLENE GLYCOL 3350 17 GRAM(S): 17 POWDER, FOR SOLUTION ORAL at 12:35

## 2017-11-05 RX ADMIN — Medication 650 MILLIGRAM(S): at 02:30

## 2017-11-05 RX ADMIN — Medication 500 MILLIGRAM(S): at 17:02

## 2017-11-05 RX ADMIN — Medication 10 MILLIGRAM(S): at 06:11

## 2017-11-05 RX ADMIN — Medication 650 MILLIGRAM(S): at 12:38

## 2017-11-05 RX ADMIN — Medication 325 MILLIGRAM(S): at 17:02

## 2017-11-05 RX ADMIN — Medication 650 MILLIGRAM(S): at 01:56

## 2017-11-05 RX ADMIN — Medication 100 MILLIGRAM(S): at 05:35

## 2017-11-05 RX ADMIN — Medication 1 TABLET(S): at 12:35

## 2017-11-05 RX ADMIN — FAMOTIDINE 20 MILLIGRAM(S): 10 INJECTION INTRAVENOUS at 05:35

## 2017-11-05 RX ADMIN — ENOXAPARIN SODIUM 30 MILLIGRAM(S): 100 INJECTION SUBCUTANEOUS at 05:35

## 2017-11-05 RX ADMIN — Medication 100 MILLIGRAM(S): at 12:34

## 2017-11-05 NOTE — PROGRESS NOTE ADULT - SUBJECTIVE AND OBJECTIVE BOX
S: Patient seen and examined. Pt. doing well, Pain endorsed but controlled. No acute events overnight.    Vital Signs Last 24 Hrs  T(C): 36.9 (04 Nov 2017 20:21), Max: 37.7 (04 Nov 2017 04:44)  T(F): 98.5 (04 Nov 2017 20:21), Max: 99.8 (04 Nov 2017 04:44)  HR: 101 (04 Nov 2017 20:21) (87 - 109)  BP: 93/57 (04 Nov 2017 20:21) (93/57 - 105/69)  BP(mean): --  RR: 15 (04 Nov 2017 20:21) (15 - 16)  SpO2: 96% (04 Nov 2017 20:21) (96% - 100%)    NAD, AOx3, comfortable  Motor: 5/5 GS/TA/EHL/FHL    Sensation: SILT   Pulses: WWP, DP/PT 2+    Dressing: C/D/I                             9.5    10.4  )-----------( 321      ( 04 Nov 2017 10:39 )             29.4                     A/P:  67y Female s/p  R CMN/ORIF  , POD# 3     -Stable  -Pain Control  -PT/PWB 25%  -DVT ppx: LVX  -Advance diet as tolerated  -f/u AM labs  -Dispo: VICKIE Gill MD, PGY-2  [phone]

## 2017-11-06 VITALS
RESPIRATION RATE: 15 BRPM | OXYGEN SATURATION: 98 % | DIASTOLIC BLOOD PRESSURE: 77 MMHG | TEMPERATURE: 98 F | SYSTOLIC BLOOD PRESSURE: 118 MMHG | HEART RATE: 91 BPM

## 2017-11-06 LAB
ANION GAP SERPL CALC-SCNC: 11 MMOL/L — SIGNIFICANT CHANGE UP (ref 5–17)
BUN SERPL-MCNC: 10 MG/DL — SIGNIFICANT CHANGE UP (ref 7–23)
CALCIUM SERPL-MCNC: 9.2 MG/DL — SIGNIFICANT CHANGE UP (ref 8.4–10.5)
CHLORIDE SERPL-SCNC: 105 MMOL/L — SIGNIFICANT CHANGE UP (ref 96–108)
CO2 SERPL-SCNC: 27 MMOL/L — SIGNIFICANT CHANGE UP (ref 22–31)
CREAT SERPL-MCNC: 0.51 MG/DL — SIGNIFICANT CHANGE UP (ref 0.5–1.3)
GLUCOSE SERPL-MCNC: 105 MG/DL — HIGH (ref 70–99)
HCT VFR BLD CALC: 28.3 % — LOW (ref 34.5–45)
HGB BLD-MCNC: 9.4 G/DL — LOW (ref 11.5–15.5)
MCHC RBC-ENTMCNC: 30.6 PG — SIGNIFICANT CHANGE UP (ref 27–34)
MCHC RBC-ENTMCNC: 33.2 G/DL — SIGNIFICANT CHANGE UP (ref 32–36)
MCV RBC AUTO: 92.2 FL — SIGNIFICANT CHANGE UP (ref 80–100)
PLATELET # BLD AUTO: 396 K/UL — SIGNIFICANT CHANGE UP (ref 150–400)
POTASSIUM SERPL-MCNC: 4 MMOL/L — SIGNIFICANT CHANGE UP (ref 3.5–5.3)
POTASSIUM SERPL-SCNC: 4 MMOL/L — SIGNIFICANT CHANGE UP (ref 3.5–5.3)
RBC # BLD: 3.07 M/UL — LOW (ref 3.8–5.2)
RBC # FLD: 13.4 % — SIGNIFICANT CHANGE UP (ref 10.3–16.9)
SODIUM SERPL-SCNC: 143 MMOL/L — SIGNIFICANT CHANGE UP (ref 135–145)
WBC # BLD: 8.8 K/UL — SIGNIFICANT CHANGE UP (ref 3.8–10.5)
WBC # FLD AUTO: 8.8 K/UL — SIGNIFICANT CHANGE UP (ref 3.8–10.5)

## 2017-11-06 PROCEDURE — 99232 SBSQ HOSP IP/OBS MODERATE 35: CPT | Mod: GC

## 2017-11-06 RX ORDER — ACETAMINOPHEN 500 MG
2 TABLET ORAL
Qty: 0 | Refills: 0 | COMMUNITY
Start: 2017-11-06

## 2017-11-06 RX ORDER — FAMOTIDINE 10 MG/ML
1 INJECTION INTRAVENOUS
Qty: 0 | Refills: 0 | COMMUNITY
Start: 2017-11-06

## 2017-11-06 RX ORDER — DOCUSATE SODIUM 100 MG
1 CAPSULE ORAL
Qty: 0 | Refills: 0 | COMMUNITY
Start: 2017-11-06

## 2017-11-06 RX ORDER — ENOXAPARIN SODIUM 100 MG/ML
0 INJECTION SUBCUTANEOUS
Qty: 0 | Refills: 0 | COMMUNITY
Start: 2017-11-06

## 2017-11-06 RX ORDER — OXYCODONE HYDROCHLORIDE 5 MG/1
1 TABLET ORAL
Qty: 0 | Refills: 0 | COMMUNITY
Start: 2017-11-06

## 2017-11-06 RX ORDER — ENOXAPARIN SODIUM 100 MG/ML
40 INJECTION SUBCUTANEOUS DAILY
Qty: 0 | Refills: 0 | Status: CANCELLED | OUTPATIENT
Start: 2017-11-07 | End: 2017-11-06

## 2017-11-06 RX ORDER — POLYETHYLENE GLYCOL 3350 17 G/17G
17 POWDER, FOR SOLUTION ORAL
Qty: 0 | Refills: 0 | COMMUNITY
Start: 2017-11-06

## 2017-11-06 RX ORDER — SENNA PLUS 8.6 MG/1
2 TABLET ORAL
Qty: 0 | Refills: 0 | COMMUNITY
Start: 2017-11-06

## 2017-11-06 RX ORDER — ENOXAPARIN SODIUM 100 MG/ML
10 INJECTION SUBCUTANEOUS ONCE
Qty: 0 | Refills: 0 | Status: COMPLETED | OUTPATIENT
Start: 2017-11-06 | End: 2017-11-06

## 2017-11-06 RX ADMIN — Medication 650 MILLIGRAM(S): at 07:06

## 2017-11-06 RX ADMIN — Medication 500 MILLIGRAM(S): at 06:00

## 2017-11-06 RX ADMIN — ENOXAPARIN SODIUM 10 MILLIGRAM(S): 100 INJECTION SUBCUTANEOUS at 13:03

## 2017-11-06 RX ADMIN — Medication 325 MILLIGRAM(S): at 05:59

## 2017-11-06 RX ADMIN — FAMOTIDINE 20 MILLIGRAM(S): 10 INJECTION INTRAVENOUS at 06:00

## 2017-11-06 RX ADMIN — Medication 650 MILLIGRAM(S): at 07:50

## 2017-11-06 RX ADMIN — Medication 650 MILLIGRAM(S): at 13:03

## 2017-11-06 RX ADMIN — ENOXAPARIN SODIUM 30 MILLIGRAM(S): 100 INJECTION SUBCUTANEOUS at 06:00

## 2017-11-06 RX ADMIN — Medication 100 MILLIGRAM(S): at 06:00

## 2017-11-06 NOTE — PROGRESS NOTE ADULT - SUBJECTIVE AND OBJECTIVE BOX
ORTHO NOTE    [x ] Pt seen/examined.  [x ] Pt without any complaints/in NAD.    [ ] Pt complains of:      ROS: [ ] Fever  [ ] Chills  [ ] CP [ ] SOB [ ] Dysnea  [ ] Palpitations [ ] Cough [ ] N/V/C/D [ ] Paresthia [ ] Other     [x ] ROS  otherwise negative    .    PHYSICAL EXAM:    Vital Signs Last 24 Hrs  T(C): 36.6 (06 Nov 2017 08:22), Max: 37.4 (05 Nov 2017 21:04)  T(F): 97.8 (06 Nov 2017 08:22), Max: 99.4 (05 Nov 2017 21:04)  HR: 91 (06 Nov 2017 08:22) (84 - 97)  BP: 118/77 (06 Nov 2017 08:22) (106/72 - 135/60)  BP(mean): --  RR: 15 (06 Nov 2017 08:22) (14 - 16)  SpO2: 98% (06 Nov 2017 08:22) (98% - 100%)    I&O's Detail    06 Nov 2017 07:01  -  06 Nov 2017 11:15  --------------------------------------------------------  IN:    Oral Fluid: 360 mL  Total IN: 360 mL    OUT:  Total OUT: 0 mL    Total NET: 360 mL           CAPILLARY BLOOD GLUCOSE                      Neuro: AAOx3    Lungs: CTA, IS demonstrated    CV:    ABD: soft, nontender    Ext: right distal femur aquacels cdi, right LE NVID motor 5/5, scd boots    LABS                        9.4    8.8   )-----------( 396      ( 06 Nov 2017 05:52 )             28.3                                11-06    143  |  105  |  10  ----------------------------<  105<H>  4.0   |  27  |  0.51    Ca    9.2      06 Nov 2017 05:53        [ ] Other Labs  [ ] None ordered            Please check or [place] when present:  •  Heart Failure:    [ ] Acute        [ ]  Acute on Chronic        [ ] Chronic         [ ] Diastolic     [ ]  Combined    •  YAHIR:     [ ] ATN        [ ]  Renal medullary necrosis       [ ]  Renal cortical necrosis                  [ ] Other pathological Lesion:  •  CKD:  [ ] Stage I   [ ] Stage II  [ ] Stage III    [ ]Stage IV   [ ]  CKD V   [ ]  Other/Unspecified:    •  Abdominal Nutritional Status:   [ ] Malnutrition-See Nutrition note    [ ] Cachexia   [ ]  Other        [ ] Supplement ordered:            [ ] Morbid Obesity: BMI >=40         ASSESSMENT/PLAN:      STATUS POST: R femur ORIF    CONTINUE:          [ ] PT- 25% pwb    [ ] DVT PPX- lovenox 40mg (recommended by Dr. Gold), scd    [ ] Pain Mgt- po meds    [ ] Dispo plan- VICKIE

## 2017-11-06 NOTE — PROGRESS NOTE ADULT - PROVIDER SPECIALTY LIST ADULT
Internal Medicine
Orthopedics
Internal Medicine

## 2017-11-06 NOTE — PROGRESS NOTE ADULT - PROBLEM SELECTOR PLAN 2
stable postop.  pain is controlled. She is participating in PT follow on path
stable postop
for OR after evaluation of her pathology
for the OR tomorrow

## 2017-11-06 NOTE — PROGRESS NOTE ADULT - SUBJECTIVE AND OBJECTIVE BOX
S: Patient seen and examined. Pt. doing well, Pain endorsed but controlled. No acute events overnight.    Vital Signs Last 24 Hrs  T(C): 36.6 (06 Nov 2017 05:00), Max: 37.4 (05 Nov 2017 21:04)  T(F): 97.8 (06 Nov 2017 05:00), Max: 99.4 (05 Nov 2017 21:04)  HR: 84 (06 Nov 2017 05:00) (84 - 97)  BP: 135/60 (06 Nov 2017 05:00) (103/66 - 135/60)  BP(mean): --  RR: 15 (06 Nov 2017 05:00) (14 - 16)  SpO2: 99% (06 Nov 2017 05:00) (98% - 100%)    NAD, AOx3, comfortable  Motor: 5/5 GS/TA/EHL/FHL    Sensation: SILT   Pulses: WWP, DP/PT 2+    Dressing: C/D/I                               9.4    8.8   )-----------( 396      ( 06 Nov 2017 05:52 )             28.3           A/P:  67y Female s/p  R CMN/ORIF  , POD# 4     -Stable  -Pain Control  -PT/PWB 25%  -DVT ppx: LVX  -Advance diet as tolerated  -f/u AM labs  -Dispo: VICKIE Gill MD, PGY-2  [phone]

## 2017-11-06 NOTE — CHART NOTE - NSCHARTNOTEFT_GEN_A_CORE
Admitting Diagnosis:   Patient is a 67y old  Female who presents with a chief complaint of R thigh pain (01 Nov 2017 10:04)      PAST MEDICAL & SURGICAL HISTORY:  Leiomyosarcoma      Current Nutrition Order:  Regular diet     PO Intake: Good (%) [   ]  Fair (50-75%) [X] Poor (<25%) [   ]    GI Issues:   Denies   + BM yesterday     Pain:  Controlled     Skin Integrity:  R femur ASW       Labs:   11-06    143  |  105  |  10  ----------------------------<  105<H>  4.0   |  27  |  0.51    Ca    9.2      06 Nov 2017 05:53      CAPILLARY BLOOD GLUCOSE          Medications:  MEDICATIONS  (STANDING):  ascorbic acid 500 milliGRAM(s) Oral two times a day  docusate sodium 100 milliGRAM(s) Oral three times a day  enoxaparin Injectable 10 milliGRAM(s) SubCutaneous once  famotidine    Tablet 20 milliGRAM(s) Oral every 12 hours  ferrous    sulfate 325 milliGRAM(s) Oral three times a day with meals  folic acid 1 milliGRAM(s) Oral daily  lactated ringers. 1000 milliLiter(s) (80 mL/Hr) IV Continuous <Continuous>  multivitamin 1 Tablet(s) Oral daily  polyethylene glycol 3350 17 Gram(s) Oral daily    MEDICATIONS  (PRN):  acetaminophen   Tablet 650 milliGRAM(s) Oral every 6 hours PRN For Temp over 38.3 C (100.94 F)  acetaminophen   Tablet. 650 milliGRAM(s) Oral every 6 hours PRN Mild Pain (1 - 3)  aluminum hydroxide/magnesium hydroxide/simethicone Suspension 30 milliLiter(s) Oral four times a day PRN Indigestion  diphenhydrAMINE   Injectable 50 milliGRAM(s) IntraMuscular every 6 hours PRN Rash and/or Itching  magnesium hydroxide Suspension 30 milliLiter(s) Oral daily PRN Constipation  morphine  - Injectable 2 milliGRAM(s) IV Push every 4 hours PRN Severe Pain  ondansetron Injectable 4 milliGRAM(s) IV Push every 6 hours PRN Nausea and/or Vomiting  oxyCODONE    IR 10 milliGRAM(s) Oral every 4 hours PRN Moderate Pain  senna 2 Tablet(s) Oral at bedtime PRN Constipation      Weight:  65.8kg (10/28)  65.8kg (11/2)    Weight Change:   Stable; continue to trend     Estimated energy needs:   IBW 53.4kg used for EER and adjusted for age/post-op.   25-30kcal/kg (1335-1602kcal), 1.2-1.4g/kg (64-75g), 30-35ml/kg (1602-1869ml).     Subjective:   68y/o F with femur pathologic fx; s/p CMN/ORIF. Pt reports a fair appetite and intake; consuming ~50-75% of meals. Denies current GI distress, pain, and mechanical issues. C/o some nausea with medication and c/o early satiety. RD discussed ways to help optimize nutrition through small frequent meals, nutrient dense foods, and balanced meals. Pt reports that she has been doing smaller meals and then family/friends will bring in snacks throughout the day.     Previous Nutrition Diagnosis:  Increased nutrient needs (specify) RT increased demand for protein AEB femur fx and pre-op    Active [X]  Resolved [   ]    If resolved, new PES:   N/A    Goal:  Pt to meet at least 75% of EER     Recommendations:  1. Continue regular diet.   2. Honor pt's preferences and encourage intake.   3. Monitor lytes and replete prn.   4. Continue MVI.     Education:   balanced diet; nutrition optimization; strategies with early satiety    Risk Level: High [   ] Moderate [X] Low [   ]

## 2017-11-06 NOTE — PROGRESS NOTE ADULT - SUBJECTIVE AND OBJECTIVE BOX
Interval Events: reviewed  Patient seen and examined at bedside.    Patient is a 67y old  Female who presents with a chief complaint of R thigh pain (01 Nov 2017 10:04)  she is doing well/  She is doign well with PT.  She ahd BM.  pain is controlled    PAST MEDICAL & SURGICAL HISTORY:  Leiomyosarcoma      MEDICATIONS:  Pulmonary:  diphenhydrAMINE   Injectable 50 milliGRAM(s) IntraMuscular every 6 hours PRN    Antimicrobials:    Anticoagulants:  enoxaparin Injectable 30 milliGRAM(s) SubCutaneous every 24 hours    Cardiac:      Allergies    No Known Allergies    Intolerances        Vital Signs Last 24 Hrs  T(C): 36.6 (06 Nov 2017 05:00), Max: 37.4 (05 Nov 2017 21:04)  T(F): 97.8 (06 Nov 2017 05:00), Max: 99.4 (05 Nov 2017 21:04)  HR: 84 (06 Nov 2017 05:00) (84 - 97)  BP: 135/60 (06 Nov 2017 05:00) (103/66 - 135/60)  BP(mean): --  RR: 15 (06 Nov 2017 05:00) (14 - 16)  SpO2: 99% (06 Nov 2017 05:00) (98% - 100%)        LABS:      CBC Full  -  ( 06 Nov 2017 05:52 )  WBC Count : 8.8 K/uL  Hemoglobin : 9.4 g/dL  Hematocrit : 28.3 %  Platelet Count - Automated : 396 K/uL  Mean Cell Volume : 92.2 fL  Mean Cell Hemoglobin : 30.6 pg  Mean Cell Hemoglobin Concentration : 33.2 g/dL  Auto Neutrophil # : x  Auto Lymphocyte # : x  Auto Monocyte # : x  Auto Eosinophil # : x  Auto Basophil # : x  Auto Neutrophil % : x  Auto Lymphocyte % : x  Auto Monocyte % : x  Auto Eosinophil % : x  Auto Basophil % : x    11-06    143  |  105  |  10  ----------------------------<  105<H>  4.0   |  27  |  0.51    Ca    9.2      06 Nov 2017 05:53                          RADIOLOGY & ADDITIONAL STUDIES (The following images were personally reviewed):  Mota:                                  No  Urine output:               Yes          DVT prophylaxis:         Yes          Flattus:                          Yes         Bowel movement:       Yes

## 2017-11-06 NOTE — PROGRESS NOTE ADULT - PROBLEM SELECTOR PLAN 3
she is hemodynamically stable postop.  She is lovenox for DVT prophylaxis.  she will be travelig to [facility] after rehab
she is clinically stable postop.  pain is controlled>  Did PT
cleared for OR
cleared for OR.  I discussed with the patient and daughter the effect of prolonged surgery and anesthesia on postoperative course

## 2017-11-08 DIAGNOSIS — Z53.29 PROCEDURE AND TREATMENT NOT CARRIED OUT BECAUSE OF PATIENT'S DECISION FOR OTHER REASONS: ICD-10-CM

## 2017-11-08 DIAGNOSIS — M80.051A AGE-RELATED OSTEOPOROSIS WITH CURRENT PATHOLOGICAL FRACTURE, RIGHT FEMUR, INITIAL ENCOUNTER FOR FRACTURE: ICD-10-CM

## 2017-11-08 DIAGNOSIS — Z85.89 PERSONAL HISTORY OF MALIGNANT NEOPLASM OF OTHER ORGANS AND SYSTEMS: ICD-10-CM

## 2017-11-08 DIAGNOSIS — R91.1 SOLITARY PULMONARY NODULE: ICD-10-CM

## 2017-12-19 PROCEDURE — 86900 BLOOD TYPING SEROLOGIC ABO: CPT

## 2017-12-19 PROCEDURE — A9585: CPT

## 2017-12-19 PROCEDURE — 85610 PROTHROMBIN TIME: CPT

## 2017-12-19 PROCEDURE — 85027 COMPLETE CBC AUTOMATED: CPT

## 2017-12-19 PROCEDURE — 71260 CT THORAX DX C+: CPT

## 2017-12-19 PROCEDURE — C1887: CPT

## 2017-12-19 PROCEDURE — C1713: CPT

## 2017-12-19 PROCEDURE — 80048 BASIC METABOLIC PNL TOTAL CA: CPT

## 2017-12-19 PROCEDURE — C1889: CPT

## 2017-12-19 PROCEDURE — 88311 DECALCIFY TISSUE: CPT

## 2017-12-19 PROCEDURE — 80053 COMPREHEN METABOLIC PANEL: CPT

## 2017-12-19 PROCEDURE — 88173 CYTOPATH EVAL FNA REPORT: CPT

## 2017-12-19 PROCEDURE — 97162 PT EVAL MOD COMPLEX 30 MIN: CPT

## 2017-12-19 PROCEDURE — 20225 BONE BIOPSY TROCAR/NDL DEEP: CPT

## 2017-12-19 PROCEDURE — 93005 ELECTROCARDIOGRAM TRACING: CPT

## 2017-12-19 PROCEDURE — 85730 THROMBOPLASTIN TIME PARTIAL: CPT

## 2017-12-19 PROCEDURE — 99285 EMERGENCY DEPT VISIT HI MDM: CPT | Mod: 25

## 2017-12-19 PROCEDURE — 76000 FLUOROSCOPY <1 HR PHYS/QHP: CPT

## 2017-12-19 PROCEDURE — 71045 X-RAY EXAM CHEST 1 VIEW: CPT

## 2017-12-19 PROCEDURE — 86850 RBC ANTIBODY SCREEN: CPT

## 2017-12-19 PROCEDURE — 77012 CT SCAN FOR NEEDLE BIOPSY: CPT

## 2017-12-19 PROCEDURE — 73700 CT LOWER EXTREMITY W/O DYE: CPT

## 2017-12-19 PROCEDURE — 97110 THERAPEUTIC EXERCISES: CPT

## 2017-12-19 PROCEDURE — 85025 COMPLETE CBC W/AUTO DIFF WBC: CPT

## 2017-12-19 PROCEDURE — 81001 URINALYSIS AUTO W/SCOPE: CPT

## 2017-12-19 PROCEDURE — 83735 ASSAY OF MAGNESIUM: CPT

## 2017-12-19 PROCEDURE — 97116 GAIT TRAINING THERAPY: CPT

## 2017-12-19 PROCEDURE — 73723 MRI JOINT LWR EXTR W/O&W/DYE: CPT

## 2017-12-19 PROCEDURE — 97161 PT EVAL LOW COMPLEX 20 MIN: CPT

## 2017-12-19 PROCEDURE — 73560 X-RAY EXAM OF KNEE 1 OR 2: CPT

## 2017-12-19 PROCEDURE — 86901 BLOOD TYPING SEROLOGIC RH(D): CPT

## 2017-12-19 PROCEDURE — 36415 COLL VENOUS BLD VENIPUNCTURE: CPT

## 2017-12-19 PROCEDURE — 73552 X-RAY EXAM OF FEMUR 2/>: CPT

## 2017-12-19 PROCEDURE — C1769: CPT

## 2017-12-19 PROCEDURE — 88305 TISSUE EXAM BY PATHOLOGIST: CPT

## 2018-02-04 NOTE — ED PROVIDER NOTE - PSYCHIATRIC, MLM
Hyperlipidemia, unspecified hyperlipidemia type Alert and oriented to person, place, time/situation. normal mood and affect. no apparent risk to self or others.

## 2019-05-20 NOTE — PROGRESS NOTE ADULT - PROBLEM SELECTOR PLAN 4
I reviewed the CT scan findings with the patient and family.  I reviewed the CT scan images.  Repeat in 6-12 month
I reviewed the CT scan findings with the patient and family.  I reviewed the CT scan images.  Repat in 6-12 month
I reviewed the CT scan findings with the patient and family.  I reviewed the CT scan images.  Reepat in 6-12 month
I reviewed the CT scan findings with the patient and family.  I reviewed the CT scan images.  Repat in 6-12 month
numerical 0-10

## 2021-08-18 NOTE — PHYSICAL THERAPY INITIAL EVALUATION ADULT - DID THE PATIENT HAVE SURGERY?
Samantha Davila called requesting a refill of the below medication which has been pended for you:     Requested Prescriptions     Pending Prescriptions Disp Refills    lisinopril-hydroCHLOROthiazide (PRINZIDE;ZESTORETIC) 20-25 MG per tablet [Pharmacy Med Name: LISINOPRIL-HCTZ 20/25MG TABLETS] 90 tablet 3     Sig: TAKE 1 TABLET BY MOUTH DAILY    diclofenac (VOLTAREN) 75 MG EC tablet [Pharmacy Med Name: DICLOFENAC SODIUM 75MG DR TABLETS] 180 tablet 3     Sig: TAKE 1 TABLET BY MOUTH TWICE DAILY WITH FOOD       Last Appointment Date: 3/9/2021  Next Appointment Date: 9/20/2021    Allergies   Allergen Reactions    Fenofibrate Rash
right femur ORIF/yes

## 2021-12-22 NOTE — OCCUPATIONAL THERAPY INITIAL EVALUATION ADULT - BATHING, PREVIOUS LEVEL OF FUNCTION, OT EVAL
independent
I personally performed the service described in the documentation recorded by the scribe in my presence, and it accurately and completely records my words and actions.
